# Patient Record
Sex: FEMALE | Race: WHITE | NOT HISPANIC OR LATINO | Employment: UNEMPLOYED | URBAN - METROPOLITAN AREA
[De-identification: names, ages, dates, MRNs, and addresses within clinical notes are randomized per-mention and may not be internally consistent; named-entity substitution may affect disease eponyms.]

---

## 2023-04-18 LAB
ACTIVATED PARTIAL THROMBOPLASTIN TIME IN PPP BY COAGULATION ASSAY: 31 SEC (ref 26–39)
ALANINE AMINOTRANSFERASE (SGPT) (U/L) IN SER/PLAS: 18 U/L (ref 7–45)
ALBUMIN (G/DL) IN SER/PLAS: 4.5 G/DL (ref 3.4–5)
ALKALINE PHOSPHATASE (U/L) IN SER/PLAS: 40 U/L (ref 33–110)
ANION GAP IN SER/PLAS: 11 MMOL/L (ref 10–20)
ASPARTATE AMINOTRANSFERASE (SGOT) (U/L) IN SER/PLAS: 18 U/L (ref 9–39)
BASOPHILS (10*3/UL) IN BLOOD BY AUTOMATED COUNT: 0.02 X10E9/L (ref 0–0.1)
BASOPHILS/100 LEUKOCYTES IN BLOOD BY AUTOMATED COUNT: 0.4 % (ref 0–2)
BILIRUBIN TOTAL (MG/DL) IN SER/PLAS: 0.8 MG/DL (ref 0–1.2)
CALCIUM (MG/DL) IN SER/PLAS: 9.7 MG/DL (ref 8.6–10.3)
CARBON DIOXIDE, TOTAL (MMOL/L) IN SER/PLAS: 30 MMOL/L (ref 21–32)
CHLORIDE (MMOL/L) IN SER/PLAS: 102 MMOL/L (ref 98–107)
CREATININE (MG/DL) IN SER/PLAS: 0.83 MG/DL (ref 0.5–1.05)
EOSINOPHILS (10*3/UL) IN BLOOD BY AUTOMATED COUNT: 0.1 X10E9/L (ref 0–0.7)
EOSINOPHILS/100 LEUKOCYTES IN BLOOD BY AUTOMATED COUNT: 1.8 % (ref 0–6)
ERYTHROCYTE DISTRIBUTION WIDTH (RATIO) BY AUTOMATED COUNT: 13.3 % (ref 11.5–14.5)
ERYTHROCYTE MEAN CORPUSCULAR HEMOGLOBIN CONCENTRATION (G/DL) BY AUTOMATED: 31.4 G/DL (ref 32–36)
ERYTHROCYTE MEAN CORPUSCULAR VOLUME (FL) BY AUTOMATED COUNT: 94 FL (ref 80–100)
ERYTHROCYTES (10*6/UL) IN BLOOD BY AUTOMATED COUNT: 4.53 X10E12/L (ref 4–5.2)
GFR FEMALE: 83 ML/MIN/1.73M2
GLUCOSE (MG/DL) IN SER/PLAS: 93 MG/DL (ref 74–99)
HEMATOCRIT (%) IN BLOOD BY AUTOMATED COUNT: 42.4 % (ref 36–46)
HEMOGLOBIN (G/DL) IN BLOOD: 13.3 G/DL (ref 12–16)
IMMATURE GRANULOCYTES/100 LEUKOCYTES IN BLOOD BY AUTOMATED COUNT: 0 % (ref 0–0.9)
INR IN PPP BY COAGULATION ASSAY: 1 (ref 0.9–1.1)
LEUKOCYTES (10*3/UL) IN BLOOD BY AUTOMATED COUNT: 5.6 X10E9/L (ref 4.4–11.3)
LYMPHOCYTES (10*3/UL) IN BLOOD BY AUTOMATED COUNT: 2.26 X10E9/L (ref 1.2–4.8)
LYMPHOCYTES/100 LEUKOCYTES IN BLOOD BY AUTOMATED COUNT: 40.6 % (ref 13–44)
MONOCYTES (10*3/UL) IN BLOOD BY AUTOMATED COUNT: 0.45 X10E9/L (ref 0.1–1)
MONOCYTES/100 LEUKOCYTES IN BLOOD BY AUTOMATED COUNT: 8.1 % (ref 2–10)
NEUTROPHILS (10*3/UL) IN BLOOD BY AUTOMATED COUNT: 2.74 X10E9/L (ref 1.2–7.7)
NEUTROPHILS/100 LEUKOCYTES IN BLOOD BY AUTOMATED COUNT: 49.1 % (ref 40–80)
PLATELETS (10*3/UL) IN BLOOD AUTOMATED COUNT: 243 X10E9/L (ref 150–450)
POTASSIUM (MMOL/L) IN SER/PLAS: 4.1 MMOL/L (ref 3.5–5.3)
PROTEIN TOTAL: 7.1 G/DL (ref 6.4–8.2)
PROTHROMBIN TIME (PT) IN PPP BY COAGULATION ASSAY: 11.6 SEC (ref 9.8–13.4)
SODIUM (MMOL/L) IN SER/PLAS: 139 MMOL/L (ref 136–145)
UREA NITROGEN (MG/DL) IN SER/PLAS: 23 MG/DL (ref 6–23)

## 2023-04-19 DIAGNOSIS — M19.90 UNSPECIFIED OSTEOARTHRITIS, UNSPECIFIED SITE: ICD-10-CM

## 2023-04-20 DIAGNOSIS — M48.061 FORAMINAL STENOSIS OF LUMBAR REGION: Primary | ICD-10-CM

## 2023-04-20 PROBLEM — M54.50 LOW BACK PAIN: Status: ACTIVE | Noted: 2023-04-20

## 2023-04-20 PROBLEM — E66.9 OBESITY: Status: ACTIVE | Noted: 2023-04-20

## 2023-04-20 PROBLEM — E78.5 HYPERLIPIDEMIA: Status: ACTIVE | Noted: 2023-04-20

## 2023-04-20 PROBLEM — I10 HTN (HYPERTENSION): Status: ACTIVE | Noted: 2023-04-20

## 2023-04-20 PROBLEM — E55.9 VITAMIN D DEFICIENCY, UNSPECIFIED: Status: ACTIVE | Noted: 2023-04-20

## 2023-04-20 PROBLEM — R60.0 LEG EDEMA: Status: ACTIVE | Noted: 2023-04-20

## 2023-04-20 PROBLEM — R92.8 ABNORMAL MAMMOGRAM: Status: ACTIVE | Noted: 2023-04-20

## 2023-04-20 PROBLEM — R69 TAKING DRUG FOR CHRONIC DISEASE: Status: ACTIVE | Noted: 2023-04-20

## 2023-04-20 PROBLEM — M25.561 ARTHRALGIA OF RIGHT KNEE: Status: ACTIVE | Noted: 2023-04-20

## 2023-04-20 PROBLEM — M54.30 SCIATIC NERVE PAIN: Status: ACTIVE | Noted: 2023-04-20

## 2023-04-20 PROBLEM — M25.519 SHOULDER PAIN WITH HISTORY OF REPAIR OF ROTATOR CUFF: Status: ACTIVE | Noted: 2023-04-20

## 2023-04-20 PROBLEM — M19.90 ARTHRITIS: Status: ACTIVE | Noted: 2023-04-20

## 2023-04-20 PROBLEM — M75.100 TEAR OF ROTATOR CUFF: Status: ACTIVE | Noted: 2023-04-20

## 2023-04-20 PROBLEM — G93.2 PSEUDOTUMOR CEREBRI: Status: ACTIVE | Noted: 2023-04-20

## 2023-04-20 PROBLEM — R16.0 HEPATOMEGALY: Status: ACTIVE | Noted: 2023-04-20

## 2023-04-20 PROBLEM — E88.819 INSULIN RESISTANCE: Status: ACTIVE | Noted: 2023-04-20

## 2023-04-20 PROBLEM — Z98.890 SHOULDER PAIN WITH HISTORY OF REPAIR OF ROTATOR CUFF: Status: ACTIVE | Noted: 2023-04-20

## 2023-04-20 PROBLEM — M47.816 DEGENERATIVE JOINT DISEASE (DJD) OF LUMBAR SPINE: Status: ACTIVE | Noted: 2023-04-20

## 2023-04-20 PROBLEM — M25.562 ARTHRALGIA OF LEFT KNEE: Status: ACTIVE | Noted: 2023-04-20

## 2023-04-20 RX ORDER — FUROSEMIDE 20 MG/1
20 TABLET ORAL DAILY
COMMUNITY

## 2023-04-20 RX ORDER — TOPIRAMATE 50 MG/1
TABLET, FILM COATED ORAL 2 TIMES DAILY
COMMUNITY
Start: 2022-04-11 | End: 2024-01-16 | Stop reason: ALTCHOICE

## 2023-04-20 RX ORDER — MELOXICAM 15 MG/1
15 TABLET ORAL DAILY
Qty: 90 TABLET | Refills: 3 | Status: SHIPPED | OUTPATIENT
Start: 2023-04-20 | End: 2023-04-25 | Stop reason: SDUPTHER

## 2023-04-20 RX ORDER — MELOXICAM 15 MG/1
15 TABLET ORAL DAILY
Qty: 90 TABLET | Refills: 3 | Status: CANCELLED | OUTPATIENT
Start: 2023-04-20

## 2023-04-20 RX ORDER — LISINOPRIL AND HYDROCHLOROTHIAZIDE 10; 12.5 MG/1; MG/1
1 TABLET ORAL DAILY
COMMUNITY

## 2023-04-20 RX ORDER — POTASSIUM CHLORIDE 750 MG/1
CAPSULE, EXTENDED RELEASE ORAL
COMMUNITY

## 2023-04-20 RX ORDER — MELOXICAM 15 MG/1
15 TABLET ORAL DAILY
COMMUNITY
End: 2023-04-20 | Stop reason: SDUPTHER

## 2023-04-20 RX ORDER — ERGOCALCIFEROL 1.25 MG/1
1 CAPSULE ORAL
COMMUNITY
Start: 2018-06-12 | End: 2024-01-16 | Stop reason: ALTCHOICE

## 2023-04-20 RX ORDER — MELOXICAM 15 MG/1
TABLET ORAL
Qty: 90 TABLET | Refills: 3 | Status: SHIPPED | OUTPATIENT
Start: 2023-04-20 | End: 2024-05-01

## 2023-04-20 RX ORDER — CYCLOBENZAPRINE HCL 10 MG
1 TABLET ORAL
COMMUNITY
Start: 2023-03-28 | End: 2024-01-16 | Stop reason: ALTCHOICE

## 2023-04-24 ENCOUNTER — HOSPITAL ENCOUNTER (OUTPATIENT)
Dept: DATA CONVERSION | Facility: HOSPITAL | Age: 58
End: 2023-04-24
Attending: ORTHOPAEDIC SURGERY | Admitting: ORTHOPAEDIC SURGERY

## 2023-04-24 DIAGNOSIS — M22.42 CHONDROMALACIA PATELLAE, LEFT KNEE: ICD-10-CM

## 2023-04-24 DIAGNOSIS — S83.242A OTHER TEAR OF MEDIAL MENISCUS, CURRENT INJURY, LEFT KNEE, INITIAL ENCOUNTER: ICD-10-CM

## 2023-04-24 DIAGNOSIS — I10 ESSENTIAL (PRIMARY) HYPERTENSION: ICD-10-CM

## 2023-04-24 DIAGNOSIS — Z79.82 LONG TERM (CURRENT) USE OF ASPIRIN: ICD-10-CM

## 2023-04-25 RX ORDER — MELOXICAM 15 MG/1
15 TABLET ORAL DAILY
Qty: 90 TABLET | Refills: 3 | Status: SHIPPED | OUTPATIENT
Start: 2023-04-25 | End: 2023-11-12 | Stop reason: WASHOUT

## 2023-08-08 LAB
ANION GAP IN SER/PLAS: 10 MMOL/L (ref 10–20)
CALCIUM (MG/DL) IN SER/PLAS: 9.8 MG/DL (ref 8.6–10.3)
CARBON DIOXIDE, TOTAL (MMOL/L) IN SER/PLAS: 33 MMOL/L (ref 21–32)
CHLORIDE (MMOL/L) IN SER/PLAS: 105 MMOL/L (ref 98–107)
CREATININE (MG/DL) IN SER/PLAS: 0.86 MG/DL (ref 0.5–1.05)
ERYTHROCYTE DISTRIBUTION WIDTH (RATIO) BY AUTOMATED COUNT: 13.3 % (ref 11.5–14.5)
ERYTHROCYTE MEAN CORPUSCULAR HEMOGLOBIN CONCENTRATION (G/DL) BY AUTOMATED: 32.1 G/DL (ref 32–36)
ERYTHROCYTE MEAN CORPUSCULAR VOLUME (FL) BY AUTOMATED COUNT: 91 FL (ref 80–100)
ERYTHROCYTES (10*6/UL) IN BLOOD BY AUTOMATED COUNT: 4.6 X10E12/L (ref 4–5.2)
GFR FEMALE: 79 ML/MIN/1.73M2
GLUCOSE (MG/DL) IN SER/PLAS: 78 MG/DL (ref 74–99)
HEMATOCRIT (%) IN BLOOD BY AUTOMATED COUNT: 41.8 % (ref 36–46)
HEMOGLOBIN (G/DL) IN BLOOD: 13.4 G/DL (ref 12–16)
LEUKOCYTES (10*3/UL) IN BLOOD BY AUTOMATED COUNT: 8.1 X10E9/L (ref 4.4–11.3)
NRBC (PER 100 WBCS) BY AUTOMATED COUNT: 0 /100 WBC (ref 0–0)
PLATELETS (10*3/UL) IN BLOOD AUTOMATED COUNT: 249 X10E9/L (ref 150–450)
POTASSIUM (MMOL/L) IN SER/PLAS: 4.6 MMOL/L (ref 3.5–5.3)
SODIUM (MMOL/L) IN SER/PLAS: 143 MMOL/L (ref 136–145)
UREA NITROGEN (MG/DL) IN SER/PLAS: 29 MG/DL (ref 6–23)

## 2023-08-30 ENCOUNTER — HOSPITAL ENCOUNTER (OUTPATIENT)
Dept: DATA CONVERSION | Facility: HOSPITAL | Age: 58
End: 2023-08-30
Attending: SURGERY | Admitting: SURGERY
Payer: COMMERCIAL

## 2023-08-30 DIAGNOSIS — D05.12 INTRADUCTAL CARCINOMA IN SITU OF LEFT BREAST: ICD-10-CM

## 2023-09-06 LAB
COMPLETE PATHOLOGY REPORT: NORMAL
CONVERTED CLINICAL DIAGNOSIS-HISTORY: NORMAL
CONVERTED FINAL DIAGNOSIS: NORMAL
CONVERTED FINAL REPORT PDF LINK TO COPY AND PASTE: NORMAL
CONVERTED GROSS DESCRIPTION: NORMAL
CONVERTED SYNOPTIC DIAGNOSIS: NORMAL

## 2023-09-07 VITALS — HEIGHT: 62 IN | BODY MASS INDEX: 40.73 KG/M2 | WEIGHT: 221.34 LBS

## 2023-09-14 NOTE — H&P
History & Physical Reviewed:   Pregnant/Lactating:  ·  Are You Pregnant no   ·  Are You Currently Breastfeeding no     I have reviewed the History and Physical dated:  17-Apr-2023   History and Physical reviewed and relevant findings noted. Patient examined to review pertinent physical  findings.: No significant changes   Home Medications Reviewed: no changes noted   Allergies Reviewed: no changes noted     Consent:   COVID-19 Consent:  ·  COVID-19 Risk Consent Surgeon has reviewed key risks related to the risk of enedelia COVID-19 and if they contract COVID-19 what the risks are.       Electronic Signatures:  Arden Lomas)  (Signed 24-Apr-2023 07:40)   Authored: History & Physical Reviewed, Consent, Note  Completion      Last Updated: 24-Apr-2023 07:40 by Arden Lomas)

## 2023-09-27 ENCOUNTER — TELEPHONE (OUTPATIENT)
Dept: CARDIOLOGY | Facility: HOSPITAL | Age: 58
End: 2023-09-27
Payer: COMMERCIAL

## 2023-09-28 DIAGNOSIS — D05.12 DUCTAL CARCINOMA IN SITU (DCIS) OF LEFT BREAST: Primary | ICD-10-CM

## 2023-09-29 VITALS
DIASTOLIC BLOOD PRESSURE: 74 MMHG | TEMPERATURE: 98.1 F | HEART RATE: 62 BPM | HEIGHT: 62 IN | RESPIRATION RATE: 18 BRPM | BODY MASS INDEX: 39.96 KG/M2 | WEIGHT: 217.15 LBS | SYSTOLIC BLOOD PRESSURE: 141 MMHG

## 2023-09-30 NOTE — H&P
"    History of Present Illness:   Pregnant/Lactating:  ·  Are You Pregnant no (1)   ·  Are You Currently Breastfeeding no (1)     History Present Illness:  Reason for surgery: left breast cancer   HPI:    Abnormal mammogram and left breast biopsy positive for DCIS.  Here today for surgical management.    Allergies:        Intolerances:  ·  codeine : Nausea/Vomiting    Home Medication Review:   Home Medications Reviewed: yes     Impression/Procedure:   ·  Impression and Planned Procedure: 1. left breast DCIS  -to OR today for left breast magseed localized partial mastectomy       ERAS (Enhanced Recovery After Surgery):  ·  ERAS Patient: no       Vital Signs:  Temperature C: 36.7 degrees C   Temperature F: 98 degrees F   Heart Rate: 62 beats per minute   Respiratory Rate: 18 breath per minute   Blood Pressure Systolic: 141 mm/Hg   Blood Pressure Diastolic: 74 mm/Hg     Physical Exam by System:    Constitutional: Well developed, awake/alert/oriented  x3, no distress, alert and cooperative   Respiratory/Thorax: Patent airways, CTAB, normal  breath sounds with good chest expansion, thorax symmetric   Cardiovascular: Regular, rate and rhythm, no murmurs,  2+ equal pulses of the extremities, normal S 1and S 2   Extremities: normal extremities, no cyanosis edema,  contusions or wounds, no clubbing   Breast: No masses, tenderness, no discharge or discoloration   Skin: Warm and dry, no lesions, no rashes     Consent:   COVID-19 Consent:  ·  COVID-19 Risk Consent Surgeon has reviewed key risks related to the risk of enedelia COVID-19 and if they contract COVID-19 what the risks are.       Electronic Signatures:  Kerry Flores ()  (Signed 30-Aug-2023 08:44)   Authored: History of Present Illness, Allergies, Home  Medication Review, Impression/Procedure, ERAS, Physical Exam, Consent, Note Completion      Last Updated: 30-Aug-2023 08:44 by Kerry Flores ()    References:  1.  Data Referenced From \"Patient Profile - " "Preop v3\" 30-Aug-2023 07:52   "

## 2023-10-01 NOTE — OP NOTE
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 LEFT MAG SEED LOCALIZED PARTIAL MASTECTOMY    Anesthesia: Amy Rojas  Estimated Blood Loss: 5ml  Findings: 1. seed, clip present on specimen xray        Operative Report:     The patient underwent pre-operative magnetic seed localization in the radiology department prior to surgery.  Localization studies were reviewed confirming appropriate localization. The patient was identified by name and birth date and the operative site  was marked. The patient was then transported to the operative suite. A sign-in was performed verifying patient identity, procedure and laterality.  One dose of preoperative antibiotics was given.  After induction of anesthesia the left breast and axilla  were prepped and draped in the usual sterile fashion.   Just prior to incision, a time-out was performed confirming patient identity, procedure and laterality. The sentimag probe was used to identify the location of the targeted lesion and marked on the  skin.  A curvilinear incision was made, and flaps were raised in the direction of the targeted lesion.  The target lesion was then circumferentially dissected using electrocautery.  Circumferential dissection was carried out to include the targeted lesion  and localization seed.  The probe was used to again confirm the presence of the localization seed within the specimen. Once the specimen was completed dissected it was oriented with a silk suture- short suture superior, long suture lateral and passed  off the field.  A specimen radiograph was performed which confirmed the presence of the lesion, biopsy clip, and localization seed.  Six additional cavity margins  were taken: superior, inferior, medial, lateral, anterior and posterior with a clip denoting  the new margin.  The posterior extent of dissection  did include pectoralis fascia. Clips were placed to denote the area of resection. Next, meticulous hemostasis was achieved.  The dermis was closed with 3-0 Vicryl suture and the skin was closed with  a running 4-0 Monocryl.  Benzoin and steri-strips were used as dressing.  The patient was then awoken from anesthesia and transported to the PACU in satisfactory condition.    SPECIMEN:    1. Left magseed localized partial mastectomy short suture superior, long lateral  2. new superior margin  3. new inferior margin  4. new medial margin  5. new lateral margin  6. new anterior margin  7. new posterior margin                        Attestation:   Note Completion:  Attending Attestation I performed the procedure without a resident         Electronic Signatures:  eKrry Flores)  (Signed 30-Aug-2023 10:38)   Authored: Post-Operative Note, Chart Review, Note Completion      Last Updated: 30-Aug-2023 10:38 by Kerry Flores ()

## 2023-10-02 ENCOUNTER — APPOINTMENT (OUTPATIENT)
Dept: RADIOLOGY | Facility: CLINIC | Age: 58
End: 2023-10-02
Payer: COMMERCIAL

## 2023-10-02 ENCOUNTER — APPOINTMENT (OUTPATIENT)
Dept: CARDIOLOGY | Facility: CLINIC | Age: 58
End: 2023-10-02
Payer: COMMERCIAL

## 2023-10-02 NOTE — OP NOTE
PROCEDURE DETAILS    Preoperative Diagnosis:  Other tear of medial meniscus, current injury, left knee, initial encounter, S83.242A, chondromalacia     Postoperative Diagnosis:  Other tear of medial meniscus, current injury, left knee, initial encounter, S83.242A, chondromalacia     Surgeon: Arden Lomas  Resident/Fellow/Other Assistant: Marko Patton    Procedure:  1. LEFT KNEE ARTHROSCOPY MEDIAL MENISCECTOMY, CHONDROPLASTY PATELLA     Anesthesia: General + local   Estimated Blood Loss: 5 ml   Findings: see op report         Operative Report:   Operative findings: (Outerbridge classification 0-4)   Patella: 4  Trochlea: 2  Medial compartment: 2  Lateral compartment: 1    Operative Indications:  Patient was noted to have internal derangement of the knee refractory to non-surgical modalities.  We discussed associated interventions and the risks of the surgery, including DVT/PE, infection, stiffness, medical complications,  and incomplete relief of pre-operative symptoms.    Implants:  None    Operative Procedure:  The patient was met prior to surgery and the appropriate extremity was marked.  We reviewed recent health history and found no contraindication to proceeding with the planned procedure.  The patient was transported to the operating room and underwent  general anesthesia.  The patient was positioned supine on the operative table with all marc prominences well-padded. A sequential compression device was placed on the contralateral leg.  A non-sterile thigh tourniquet was placed and a padded lateral  thigh post.    The leg was prepped and draped in the usual sterile fashion.  A timeout was completed and antibiotic administration was in accordance with standard protocol.  The leg was exsanguinated using an Esmarch bandage and the tourniquet was inflated.  The superficial  landmarks of the knee were palpated and anteromedial and anterolateral portals were created.    A diagnostic arthroscopy was  performed utilizing a fluid pump with sterile saline.  The articular surface of the patella, trochlea, medial and lateral femoral condyles and tibial plateaus were evaluated.  The Outerbridge classifications are listed above.   The gutters were evaluated and no loose bodies were noted. The medial compartment was entered with valgus stress in a slightly flexed knee against the lateral post.  The assistant helped with this to facilitate visualization.  The meniscus was probed  superiorly and inferiorly using a blunt probe.  The notch was inspected and the ACL and PCL were healthy in appearance and had appropriate tension when probed.  The knee was then flexed into a figure of four position and the lateral compartment was entered.       The articular surface of the patella had chondral wear and a component of delamination which we felt could contribute to mechanical symptoms.  A 3.5 mm aggressive plus shaver was used to debride the articular cartilage until stable margins were obtained.    The medial meniscus was noted to have a degenerative tear of the posterior horn.   Using an aggressive plus shaver and meniscal biters, we debrided the meniscus back until we obtained healthy and stable margins.  The meniscectomy extended about 25 % to  the periphery in the affected area.    The medial compartment was fairly tight and the medial collateral ligament was lengthening using an 18 gauge spinal needle in an outside-in fashion. This was done in a controlled fashion and no instability was noted on physical examination after.    The knee was irrigated and lavaged to remove and loose meniscal or chondral debris.  A second pass was made diagnostically to confirm removal of any fragments and inspect for any additional pathology.  The residual fluid was expressed from the knee.   The portals were closed using a buried 3-0 monocryl suture.  Local anesthetic was injected into the soft tissue around the portals. Sterile bandages  were placed.  The patient was stable to the recovery room.    I was present and participated in the entire procedure. The Physician Assistant participated in all critical elements of the procedure under my direct supervision. The surgical incision was closed by the PA under my indirect supervision. There were no  qualified residents available to assist.    Complications: none    Specimen: none    The physician assistant was present for the entire case.  Given the nature of the procedure and disease process a skilled surgical assistant was  necessary for the case.  The assistant was necessary for retraction and helped directly facilitate completion of the surgery.  A certified scrub tech was at the back table managing instruments and supplies for the surgical procedure.  Note Recipients:   Arden Lomas MD - [phone] [PREFERRED]  Frederick Briones DO - [phone] [PREFERRED]                        Attestation:   Note Completion:  Attending Attestation I performed the procedure without a resident         Electronic Signatures:  Arden Lomas)  (Signed 24-Apr-2023 09:53)   Authored: Post-Operative Note, Chart Review, Note Completion      Last Updated: 24-Apr-2023 09:53 by Arden Lomas)

## 2023-10-05 ENCOUNTER — APPOINTMENT (OUTPATIENT)
Dept: RADIOLOGY | Facility: CLINIC | Age: 58
End: 2023-10-05
Payer: COMMERCIAL

## 2023-10-05 ENCOUNTER — APPOINTMENT (OUTPATIENT)
Dept: CARDIOLOGY | Facility: CLINIC | Age: 58
End: 2023-10-05
Payer: COMMERCIAL

## 2023-10-16 ENCOUNTER — APPOINTMENT (OUTPATIENT)
Dept: RADIOLOGY | Facility: HOSPITAL | Age: 58
End: 2023-10-16
Payer: COMMERCIAL

## 2023-10-19 ENCOUNTER — HOSPITAL ENCOUNTER (OUTPATIENT)
Dept: RADIOLOGY | Facility: HOSPITAL | Age: 58
Discharge: HOME | End: 2023-10-19
Payer: COMMERCIAL

## 2023-10-19 DIAGNOSIS — D05.12 DUCTAL CARCINOMA IN SITU (DCIS) OF LEFT BREAST: ICD-10-CM

## 2023-10-19 PROCEDURE — 77080 DXA BONE DENSITY AXIAL: CPT | Performed by: STUDENT IN AN ORGANIZED HEALTH CARE EDUCATION/TRAINING PROGRAM

## 2023-10-19 PROCEDURE — 77080 DXA BONE DENSITY AXIAL: CPT

## 2023-11-09 ENCOUNTER — APPOINTMENT (OUTPATIENT)
Dept: CARDIOLOGY | Facility: CLINIC | Age: 58
End: 2023-11-09

## 2023-11-09 PROBLEM — Z98.890 S/P RIGHT ROTATOR CUFF REPAIR: Status: ACTIVE | Noted: 2020-12-02

## 2023-11-09 PROBLEM — I25.10 CAD (CORONARY ARTERY DISEASE): Status: ACTIVE | Noted: 2023-11-09

## 2023-11-09 PROBLEM — G47.33 OSA (OBSTRUCTIVE SLEEP APNEA): Status: ACTIVE | Noted: 2023-11-09

## 2023-11-09 PROBLEM — M25.611 DECREASED RIGHT SHOULDER RANGE OF MOTION: Status: ACTIVE | Noted: 2020-12-02

## 2023-11-09 RX ORDER — ASPIRIN 81 MG/1
81 TABLET ORAL 2 TIMES DAILY
COMMUNITY
Start: 2023-04-24 | End: 2024-01-16

## 2023-11-09 RX ORDER — ANASTROZOLE 1 MG/1
1 TABLET ORAL DAILY
COMMUNITY
Start: 2023-09-28 | End: 2023-11-12 | Stop reason: WASHOUT

## 2023-11-09 RX ORDER — TOPIRAMATE 25 MG/1
50 TABLET ORAL 2 TIMES DAILY
COMMUNITY
Start: 2023-10-09

## 2023-11-09 RX ORDER — ONDANSETRON 4 MG/1
4 TABLET, FILM COATED ORAL EVERY 8 HOURS PRN
COMMUNITY
Start: 2023-08-30 | End: 2024-01-16 | Stop reason: ALTCHOICE

## 2023-11-09 RX ORDER — AA/PROT/LYSINE/METHIO/VIT C/B6 50-12.5 MG
100 TABLET ORAL
COMMUNITY

## 2023-11-09 RX ORDER — ROSUVASTATIN CALCIUM 20 MG/1
20 TABLET, COATED ORAL DAILY
COMMUNITY
Start: 2023-10-24 | End: 2024-05-03

## 2023-11-09 RX ORDER — FLUTICASONE PROPIONATE 50 MCG
1 SPRAY, SUSPENSION (ML) NASAL DAILY
COMMUNITY
Start: 2023-07-06 | End: 2024-01-16 | Stop reason: ALTCHOICE

## 2023-11-09 RX ORDER — TRAMADOL HYDROCHLORIDE 50 MG/1
50 TABLET ORAL EVERY 8 HOURS PRN
COMMUNITY
Start: 2023-08-30 | End: 2024-01-16 | Stop reason: ALTCHOICE

## 2023-11-10 ENCOUNTER — OFFICE VISIT (OUTPATIENT)
Dept: HEMATOLOGY/ONCOLOGY | Facility: CLINIC | Age: 58
End: 2023-11-10
Payer: COMMERCIAL

## 2023-11-10 VITALS
RESPIRATION RATE: 16 BRPM | TEMPERATURE: 97.5 F | WEIGHT: 212.74 LBS | HEIGHT: 62 IN | BODY MASS INDEX: 39.15 KG/M2 | OXYGEN SATURATION: 96 % | SYSTOLIC BLOOD PRESSURE: 110 MMHG | HEART RATE: 59 BPM | DIASTOLIC BLOOD PRESSURE: 69 MMHG

## 2023-11-10 DIAGNOSIS — D05.12 DUCTAL CARCINOMA IN SITU (DCIS) OF LEFT BREAST: ICD-10-CM

## 2023-11-10 PROCEDURE — 1036F TOBACCO NON-USER: CPT

## 2023-11-10 PROCEDURE — 3078F DIAST BP <80 MM HG: CPT

## 2023-11-10 PROCEDURE — 3074F SYST BP LT 130 MM HG: CPT

## 2023-11-10 PROCEDURE — 99215 OFFICE O/P EST HI 40 MIN: CPT

## 2023-11-10 RX ORDER — ACETAMINOPHEN 500 MG
1 TABLET ORAL DAILY
COMMUNITY

## 2023-11-10 ASSESSMENT — PAIN SCALES - GENERAL: PAINLEVEL: 0-NO PAIN

## 2023-11-10 NOTE — PROGRESS NOTES
"Patient ID: Keara Veronica is a 58 y.o. female.    Oncologic history:   Patient initially diagnosed after screening mammogram on 5/31/2023 and was found to have new left breast   calcifications. She underwent diagnostic imaging on 6/16/2023, which showed calcifications in the deep outer   quadrant measuring 5 mm. Biopsy on 7/7/2023 showed DCIS, grade 2, ER 95%. She underwent a left partial   mastectomy on 8/30/2023, which showed DCIS only measuring 0.6 cm. Margins were greater than 2 mm. ER greater than 95%.     Subjective    HPI  Ms. Keara Veronica is a 57 y/o F presenting for follow up of left breast cancer.     Keara reports that since her last visit she has not started the anastrozole.  She reports this is because she and her  try to do \"as much as possible with as little medicine as possible\" and that she is hesitant about the side effects.  Of note she did see Dr. CHRISSY Camacho on 9/18/2023 and plan was for her to discuss radiation therapy further with her  before making a decision, she ultimately decided to declining radiation therapy due to potential side effects especially of cardiac and pulmonary toxicity.    She reports that she enjoys Pilates and that she tries to eat a balanced, healthy diet.  She does not smoke and does not drink.  She follows with pain management for back pain.    Keara reports feeling well and denies unusual headaches, breast changes or concerns, shortness of breath, cough, skin changes, GI problems or bone pain that is new or worsening.      PMHx: HTN and hypercholesterolemia.    PSHx: Breast lumpectomy, left partial mastectomy, meniscus repair and 2 rotator cuff surgeries. No complications.     FHx: Sister with breast cancer DCIS at age 43, paternal cousin with breast cancer ag30 and brother with glioblastoma age 50.    Social Hx:  with 2 healthy children and 4 grandchildren. Previous smoker of 1 PPD on and off for 10 years. Not currently working.         Objective    Visit " "Vitals  /69   Pulse 59   Temp 36.4 °C (97.5 °F) (Temporal)   Resp 16   Ht 1.574 m (5' 1.97\")   Wt 96.5 kg (212 lb 11.9 oz)   SpO2 96%   BMI 38.95 kg/m²   Smoking Status Former   BSA 2.05 m²      Physical Exam  Constitutional:       General: She is not in acute distress.     Appearance: Normal appearance.   HENT:      Head: Normocephalic.      Mouth/Throat:      Mouth: Mucous membranes are moist.      Pharynx: No oropharyngeal exudate or posterior oropharyngeal erythema.   Eyes:      General: No scleral icterus.     Pupils: Pupils are equal, round, and reactive to light.   Cardiovascular:      Rate and Rhythm: Normal rate and regular rhythm.   Pulmonary:      Effort: Pulmonary effort is normal. No respiratory distress.      Breath sounds: Normal breath sounds. No wheezing.   Abdominal:      General: Abdomen is flat. Bowel sounds are normal. There is no distension.      Palpations: Abdomen is soft.      Tenderness: There is no abdominal tenderness.   Musculoskeletal:         General: Normal range of motion.      Cervical back: Normal range of motion and neck supple.   Skin:     General: Skin is warm and dry.   Neurological:      General: No focal deficit present.      Mental Status: She is alert and oriented to person, place, and time. Mental status is at baseline.      Motor: No weakness.      Gait: Gait normal.   Psychiatric:         Mood and Affect: Mood normal.         Behavior: Behavior normal.         Judgment: Judgment normal.         Performance Status:  Asymptomatic      Assessment/Plan       Cancer Staging   No matching staging information was found for the patient.    Oncology History    No history exists.       1. Left breast DCIS    - initially diagnosed after screening mammogram on 5/31/2023 and was found to have new left breast   calcifications  - diagnostic imaging on 6/16/2023, which showed calcifications in the deep outer quadrant measuring 5 mm. -Biopsy on 7/7/2023 showed DCIS, grade 2, ER " 95%  - left partial mastectomy on 8/30/2023, which showed DCIS only measuring 0.6 cm. Margins were greater than 2 mm. ER greater than 95%.     - Today we discussed given the hormone-positive DCIS, we do recommend endocrine therapy to prevent recurrence in the ipsilateral/contralateral breast. Reviewed side effects today.   - She does not want to continue with radiation therapy.   - We will plan to start anastrozole in the next 1-2 weeks after she has recovered from radiation and we will   plan for a toxicity check with Gennaro 6 weeks post. She knows us to call us if any new symptoms arise before   then.   - RTC with Gennaro in 6 weeks.      11/10/2023:  -Presents for 6-week follow-up  -She has not yet started the anastrozole due to preferring to limit medication use and risks of aromatase inhibitor therapy such as bone loss  -We had a lengthy discussion regarding her case and I answered her questions and concerns  -We discussed healthy lifestyle habits that are recommended with breast cancer survivors such as healthy diet, no smoking or alcohol intake, routine exercise  -We discussed signs and symptoms to monitor for in regards to recurrence of her breast cancer  -She is aware that if she has further questions regarding the statistics and percentages around her breast cancer recurring that that is more appropriate to this discuss with Dr. Veliz but she states that she was previously educated on that  -We did review her DEXA bone density results were normal  -She would like a referral to the [facility] program, I will fax that over  -She is going to think further about taking the anastrozole and discussed with her family/  -She will call me with any questions or concerns in the meantime, or if she needs a new prescription sent 10  -I will have her return the week after [place] for a follow-up with Dr. Veliz including a breast exam which she will be due for at that time     Diagnoses and all orders for  this visit:  Ductal carcinoma in situ (DCIS) of left breast  -     Clinic Appointment Request LYDIA ROMERO  -     Clinic Appointment Request Follow up; Future         Lydia Romero, SUZI-CNP

## 2023-11-15 ENCOUNTER — ANCILLARY PROCEDURE (OUTPATIENT)
Dept: RADIOLOGY | Facility: CLINIC | Age: 58
End: 2023-11-15
Payer: COMMERCIAL

## 2023-11-15 ENCOUNTER — HOSPITAL ENCOUNTER (OUTPATIENT)
Dept: CARDIOLOGY | Facility: CLINIC | Age: 58
Discharge: HOME | End: 2023-11-15
Payer: COMMERCIAL

## 2023-11-15 DIAGNOSIS — I25.10 CAD (CORONARY ARTERY DISEASE): Primary | ICD-10-CM

## 2023-11-15 DIAGNOSIS — I25.118 CORONARY ARTERY DISEASE WITH OTHER FORM OF ANGINA PECTORIS, UNSPECIFIED VESSEL OR LESION TYPE, UNSPECIFIED WHETHER NATIVE OR TRANSPLANTED HEART (CMS-HCC): Primary | ICD-10-CM

## 2023-11-15 DIAGNOSIS — I25.10 ATHEROSCLEROTIC HEART DISEASE OF NATIVE CORONARY ARTERY WITHOUT ANGINA PECTORIS: Primary | ICD-10-CM

## 2023-11-15 DIAGNOSIS — I25.10 ATHEROSCLEROTIC HEART DISEASE OF NATIVE CORONARY ARTERY WITHOUT ANGINA PECTORIS: ICD-10-CM

## 2023-11-15 PROCEDURE — 78452 HT MUSCLE IMAGE SPECT MULT: CPT | Performed by: INTERNAL MEDICINE

## 2023-11-15 PROCEDURE — 93017 CV STRESS TEST TRACING ONLY: CPT

## 2023-11-15 PROCEDURE — 93016 CV STRESS TEST SUPVJ ONLY: CPT | Performed by: INTERNAL MEDICINE

## 2023-11-15 PROCEDURE — 2500000004 HC RX 250 GENERAL PHARMACY W/ HCPCS (ALT 636 FOR OP/ED): Performed by: INTERNAL MEDICINE

## 2023-11-15 PROCEDURE — 78452 HT MUSCLE IMAGE SPECT MULT: CPT

## 2023-11-15 RX ORDER — REGADENOSON 0.08 MG/ML
0.4 INJECTION, SOLUTION INTRAVENOUS ONCE
Status: COMPLETED | OUTPATIENT
Start: 2023-11-15 | End: 2023-11-15

## 2023-11-15 RX ADMIN — REGADENOSON 0.4 MG: 0.08 INJECTION, SOLUTION INTRAVENOUS at 10:07

## 2023-11-22 ENCOUNTER — APPOINTMENT (OUTPATIENT)
Dept: CARDIOLOGY | Facility: CLINIC | Age: 58
End: 2023-11-22
Payer: COMMERCIAL

## 2023-11-22 ENCOUNTER — APPOINTMENT (OUTPATIENT)
Dept: RADIOLOGY | Facility: CLINIC | Age: 58
End: 2023-11-22
Payer: COMMERCIAL

## 2024-01-05 PROBLEM — I25.10 CAD (CORONARY ARTERY DISEASE): Chronic | Status: ACTIVE | Noted: 2023-11-09

## 2024-01-05 PROBLEM — D05.10 INTRADUCTAL CARCINOMA IN SITU OF BREAST: Status: ACTIVE | Noted: 2023-07-26

## 2024-01-05 PROBLEM — E78.5 HYPERLIPIDEMIA: Chronic | Status: ACTIVE | Noted: 2023-04-20

## 2024-01-05 PROBLEM — K63.5 POLYP OF COLON: Status: ACTIVE | Noted: 2024-01-05

## 2024-01-05 PROBLEM — H69.90 DYSFUNCTION OF EUSTACHIAN TUBE: Status: ACTIVE | Noted: 2023-07-06

## 2024-01-05 PROBLEM — E66.812 CLASS 2 OBESITY WITH BODY MASS INDEX (BMI) OF 38.0 TO 38.9 IN ADULT: Status: ACTIVE | Noted: 2023-04-20

## 2024-01-05 PROBLEM — G89.29 CHRONIC LOW BACK PAIN: Status: ACTIVE | Noted: 2023-03-23

## 2024-01-05 PROBLEM — M54.50 CHRONIC LOW BACK PAIN: Status: ACTIVE | Noted: 2023-03-23

## 2024-01-05 PROBLEM — M75.101 TEAR OF RIGHT ROTATOR CUFF: Status: ACTIVE | Noted: 2023-02-01

## 2024-01-05 NOTE — PROGRESS NOTES
Patient ID: Kaera Veronica is a 58 y.o. female.    Oncologic history:   Patient initially diagnosed after screening mammogram on 5/31/2023 and was found to have new left breast   calcifications. She underwent diagnostic imaging on 6/16/2023, which showed calcifications in the deep outer   quadrant measuring 5 mm. Biopsy on 7/7/2023 showed DCIS, grade 2, ER 95%. She underwent a left partial   mastectomy on 8/30/2023, which showed DCIS only measuring 0.6 cm. Margins were greater than 2 mm. ER greater than 95%.     Subjective    HPI  Ms. Keara Veronica is a 59 y/o F presenting for follow-up of left breast DCIS.     Since last visit, patient had a bone density completed on 10/19/2023, which shows a max SUV score of 6, currently normal.     Patient reports that overall she has been feeling well with good energy and good appetite. She reports chronic back pain, however denies any new bone pain. She reports having intermittent left breast pain but denies any new swelling. Denies any new chest pain, cough or shortness of breath. No new lumps or bumps or rashes. No other complaints today.     Patient's past medical history, surgical history, family history and social history reviewed.     Objective    Vitals:    01/08/24 0926   BP: 116/76   Pulse: 69   Resp: 16   Temp: 36.2 °C (97.2 °F)   SpO2: 100%      Review of Systems:   Review of Systems:    Positive per HPI, otherwise negative.      Physical Exam:   Constitutional: Patient appears in no acute distress.   Sitting comfortably in chair.  Eyes: EOMI, clear sclera  ENMT: mucous membranes moist, no apparent injury  Head/Neck: Neck supple, no JVD  Respiratory/Thorax: Patent airways, CTAB, normal  breath sounds, no increased work of breathing  Cardiovascular: Regular, rate and rhythm, no murmurs  Gastrointestinal: Nondistended, soft, non-tender,  no rebound tenderness or guarding, no masses palpable  Extremities: normal extremities, no cyanosis edema,  no swelling  Neurological:  alert and oriented x3, nonfocal, normal  speech and hearing  Lymphatic: No palpable lymphadenopathy in cervical,  axillary  lymph nodes.  Spleen appears normal size.  Psychological: Appropriate mood and behavior, normal  affect  Skin: Warm and dry, no lesions, no rashes     Performance Status:  Symptomatic; fully ambulatory    Labs/Imaging/Pathology: personally reviewed reports and images in Epic electronic medical record system. Pertinent results as it related to the plan represented in below in assessment and plan.     Assessment/Plan   1. Left breast DCIS     - initially diagnosed after screening mammogram on 5/31/2023 and was found to have new left breast   calcifications  - diagnostic imaging on 6/16/2023, which showed calcifications in the deep outer quadrant measuring 5 mm. -Biopsy on 7/7/2023 showed DCIS, grade 2, ER 95%  - left partial mastectomy on 8/30/2023, which showed DCIS only measuring 0.6 cm. Margins were greater than 2 mm. ER greater than 95%.    - Today we discussed given the hormone-positive DCIS, we do recommend endocrine therapy to prevent recurrence in the ipsilateral/contralateral breast. Reviewed side effects today.   - She does not want to continue with radiation therapy.   - We will plan to start anastrozole in the next 1-2 weeks after she has recovered from radiation and we will   plan for a toxicity check with Gennaro 6 weeks post. She knows us to call us if any new symptoms arise before   then.   - RTC with Gennaro in 6 weeks.       1/10/2023:  -Presents for 6-week follow-up  -She has not yet started the anastrozole due to preferring to limit medication use and risks of aromatase inhibitor therapy such as bone loss  -We had a lengthy discussion regarding her case and I answered her questions and concerns  -We discussed healthy lifestyle habits that are recommended with breast cancer survivors such as healthy diet, no smoking or alcohol intake, routine exercise  -We discussed signs and  symptoms to monitor for in regards to recurrence of her breast cancer  -She is aware that if she has further questions regarding the statistics and percentages around her breast cancer recurring that that is more appropriate to this discuss with Dr. Veliz but she states that she was previously educated on that  -We did review her DEXA bone density results were normal  -She would like a referral to the [facility], I will fax that over  -She is going to think further about taking the anastrozole and discussed with her family/  -She will call me with any questions or concerns in the meantime, or if she needs a new prescription sent 10  -I will have her return the week after [place] for a follow-up with Dr. Veliz including a breast exam which she will be due for at that time     1/8/24:  - No evidence of recurrence on exam today.  - We discussed that she does have some pain in her left breast that I do think is related to postsurgical changes. No palpable masses.  - She is planned for mammogram in June and we will see her every 6 months.  - RTC in December for breast exam and surveillance.   - She will see the Breast Team once a year and us once a year for breast exams twice a year and mammogram once a year.  - She continues to wish to stay off of aromatase inhibitors.   - Next bone density will be due in October 2025.  - RTC in December 2024.     RTC in December 2024. This note has been transcribed using a medical scribe and there is a possibility of unintentional typing misprints.      Diagnoses and all orders for this visit:  Ductal carcinoma in situ (DCIS) of left breast  -     Clinic Appointment Request Follow up  -     Clinic Appointment Request; Future    Mary Ann Veliz MD  Hematology/Oncology  [facility]    Scribe Attestation  By signing my name below, Juliette MASCORRO Scribe attest that this documentation has been prepared under the direction and in the presence of  Mary Ann Veliz MD.

## 2024-01-08 ENCOUNTER — OFFICE VISIT (OUTPATIENT)
Dept: HEMATOLOGY/ONCOLOGY | Facility: CLINIC | Age: 59
End: 2024-01-08
Payer: COMMERCIAL

## 2024-01-08 VITALS
WEIGHT: 215.61 LBS | OXYGEN SATURATION: 100 % | RESPIRATION RATE: 16 BRPM | HEART RATE: 69 BPM | TEMPERATURE: 97.2 F | SYSTOLIC BLOOD PRESSURE: 116 MMHG | BODY MASS INDEX: 39.48 KG/M2 | DIASTOLIC BLOOD PRESSURE: 76 MMHG

## 2024-01-08 DIAGNOSIS — D05.12 DUCTAL CARCINOMA IN SITU (DCIS) OF LEFT BREAST: ICD-10-CM

## 2024-01-08 PROCEDURE — 3074F SYST BP LT 130 MM HG: CPT | Performed by: INTERNAL MEDICINE

## 2024-01-08 PROCEDURE — 1036F TOBACCO NON-USER: CPT | Performed by: INTERNAL MEDICINE

## 2024-01-08 PROCEDURE — 99214 OFFICE O/P EST MOD 30 MIN: CPT | Performed by: INTERNAL MEDICINE

## 2024-01-08 PROCEDURE — 3078F DIAST BP <80 MM HG: CPT | Performed by: INTERNAL MEDICINE

## 2024-01-08 ASSESSMENT — PAIN SCALES - GENERAL: PAINLEVEL: 0-NO PAIN

## 2024-01-15 PROBLEM — D05.10 INTRADUCTAL CARCINOMA IN SITU OF BREAST: Chronic | Status: ACTIVE | Noted: 2023-07-26

## 2024-01-15 PROBLEM — G47.33 OSA (OBSTRUCTIVE SLEEP APNEA): Chronic | Status: ACTIVE | Noted: 2023-11-09

## 2024-01-15 PROBLEM — I10 HTN (HYPERTENSION): Chronic | Status: ACTIVE | Noted: 2023-04-20

## 2024-01-15 NOTE — PROGRESS NOTES
Referred by No ref. provider found    HPI Feeling better. No further CP. In Aug she lost her job.    She did not think she was stressed but her symptoms have definitely improved.  She is tolerating the rosuvastatin without difficulty.    Past Medical History:  Problem List Items Addressed This Visit    None       Past Medical History:   Diagnosis Date    CAD (coronary artery disease) 2023 CAC = 0   CAC = 5     Hyperlipidemia 2023    Baseline     Personal history of other diseases of the circulatory system 2022    History of essential hypertension    Unspecified osteoarthritis, unspecified site 2022    Arthritis             Past Surgical History:  She has a past surgical history that includes  section, classic (2015); Breast lumpectomy (2015); Other surgical history (2022); and BI mammo guided breast left localization (Left, 2023).      Social History:  She reports that she quit smoking about 14 years ago. Her smoking use included cigarettes. She started smoking about 42 years ago. She has a 14.00 pack-year smoking history. She has been exposed to tobacco smoke. She has never used smokeless tobacco. She reports that she does not currently use alcohol. She reports that she does not use drugs.    Family History:  Family History   Problem Relation Name Age of Onset    Arthritis Mother      Other (Cardiac disorder) Father      Diabetes Father      Breast cancer Sister      Other (mental disability) Sister          Allergies:  Patient has no known allergies.    Outpatient Medications:  Current Outpatient Medications   Medication Instructions    aspirin 81 mg, oral, 2 times daily    C/sourcherry/celery/grape seed (TART CHERRY ORAL) 1 tablet, oral, Daily    cholecalciferol (Vitamin D3) 5,000 Units tablet 1 tablet, oral, Daily    coenzyme Q-10 100 mg, oral, Daily RT    cyclobenzaprine (Flexeril) 10 mg tablet 1 tablet, oral, Every 6 hours during day     DOCOSAHEXAENOIC ACID ORAL 1 capsule, oral, Daily RT    ergocalciferol (Vitamin D-2) 1.25 MG (16986 UT) capsule 1 capsule, oral, Weekly    fish oil concentrate (Omega-3) 120-180 mg capsule 1 capsule, oral, Daily    fluticasone (Flonase) 50 mcg/actuation nasal spray 1 spray, Each Nostril, Daily    furosemide (LASIX) 20 mg, oral, Daily    GENERIC EXTERNAL MEDICATION 1,000 mg, oral, Daily, Tumeric     KRILL OIL ORAL 1 capsule, oral, Daily    lisinopriL-hydrochlorothiazide 10-12.5 mg tablet 1 tablet, oral, Daily    meloxicam (Mobic) 15 mg tablet TAKE 1 TABLET BY MOUTH EVERY DAY    ondansetron (ZOFRAN) 4 mg, oral, Every 8 hours PRN    potassium chloride ER (Micro-K) 10 mEq ER capsule oral    rosuvastatin (CRESTOR) 20 mg, oral, Daily    topiramate (Topamax) 50 mg tablet oral, 2 times daily    topiramate (TOPAMAX) 50 mg, oral, 2 times daily    traMADol (ULTRAM) 50 mg, oral, Every 8 hours PRN        Last Recorded Vitals:  There were no vitals filed for this visit.    Physical Exam    Physical  Patient is alert and oriented x3.  HEENT is unremarkable mucous members are moist  Neck no JVP no bruits upstrokes are full no thyromegaly  Lungs are clear bilaterally.  No wheezing crackles or rales  Heart regular rhythm normal S1-S2 there is no S3 no murmurs are heard.  Abdomen is soft vessels are positive nontender nondistended no organomegaly no pulsatile masses  Extremities have no edema.  Distal pulses present palpable.  Neuro is grossly nonfocal  Skin has no rashes     Last Labs:  CBC -  Lab Results   Component Value Date    WBC 8.1 08/08/2023    HGB 13.4 08/08/2023    HCT 41.8 08/08/2023    MCV 91 08/08/2023     08/08/2023       CMP -  Lab Results   Component Value Date    CALCIUM 9.8 08/08/2023    PROT 7.1 04/18/2023    ALBUMIN 4.5 04/18/2023    AST 18 04/18/2023    ALT 18 04/18/2023    ALKPHOS 40 04/18/2023    BILITOT 0.8 04/18/2023       LIPID PANEL -   Lab Results   Component Value Date    CHOL 212 (H) 04/07/2022     HDL 56.3 04/07/2022    CHHDL 3.8 04/07/2022    VLDL 19 04/07/2022    TRIG 97 04/07/2022       RENAL FUNCTION PANEL -   Lab Results   Component Value Date    K 4.6 08/08/2023       Lab Results   Component Value Date    HGBA1C 5.3 04/07/2022     Procedure  AST 11/15/2023 normal EKG, normal perfusion, EF 73%    CT / SCORING [05/27/2022] = 5.2 (LAD)     CAC 5/15/2017 0     Pharmacologic stress test 5/18/2017 normal EF 79%     Echo 5/18/2017 EF 65% normal LV function     Echo [facility] 7/25/2014 EF 62% no significant valvular disease         Assessment/Plan   1. CAD. She has a very minimally elevated calcium score 5 units. This is a change from the 0 that she had in 2017. She has vague symptoms of chest pressure which sometimes get worse with exertion. Clinically I suspect this is not anginal.  She had a regadenoson nuclear stress test 11/15/2023 which was normal.  She has had no recurrent symptoms.  Clinically her symptoms are likely reflux related.  Continue conservative care.  She will continue with rosuvastatin.    2. Hyperlipidemia. Her baseline LDL is 136. Now that we have some plaque in her artery her target LDL is less than 70.  She is tolerating the rosuvastatin.  She has not yet had any blood work drawn.  She will have this drawn in the near future and then call our office to review.  Her primary care doctors will follow the lab work and the prescriptions from this point forward.     3. Hypertension well-controlled     4. Breast cancer. She had a lumpectomy. They are contemplating radiation therapy for this. She is not getting chemotherapy. Her LV function in 2014 and in 2017 on her echocardiograms was normal.  I will see Keara in the future as needed.  She will call if there is any issues.    Christina Hernández MD     Instructions and follow up

## 2024-01-16 ENCOUNTER — OFFICE VISIT (OUTPATIENT)
Dept: CARDIOLOGY | Facility: CLINIC | Age: 59
End: 2024-01-16
Payer: COMMERCIAL

## 2024-01-16 VITALS
OXYGEN SATURATION: 99 % | HEIGHT: 61 IN | SYSTOLIC BLOOD PRESSURE: 120 MMHG | WEIGHT: 219 LBS | HEART RATE: 63 BPM | DIASTOLIC BLOOD PRESSURE: 80 MMHG | BODY MASS INDEX: 41.35 KG/M2

## 2024-01-16 DIAGNOSIS — I25.10 CORONARY ARTERY DISEASE INVOLVING NATIVE CORONARY ARTERY OF NATIVE HEART WITHOUT ANGINA PECTORIS: Chronic | ICD-10-CM

## 2024-01-16 DIAGNOSIS — G47.33 OSA (OBSTRUCTIVE SLEEP APNEA): Chronic | ICD-10-CM

## 2024-01-16 DIAGNOSIS — E78.2 MIXED HYPERLIPIDEMIA: Primary | Chronic | ICD-10-CM

## 2024-01-16 DIAGNOSIS — I10 PRIMARY HYPERTENSION: Chronic | ICD-10-CM

## 2024-01-16 PROCEDURE — 99213 OFFICE O/P EST LOW 20 MIN: CPT | Performed by: INTERNAL MEDICINE

## 2024-01-16 PROCEDURE — 3074F SYST BP LT 130 MM HG: CPT | Performed by: INTERNAL MEDICINE

## 2024-01-16 PROCEDURE — 1036F TOBACCO NON-USER: CPT | Performed by: INTERNAL MEDICINE

## 2024-01-16 PROCEDURE — 3079F DIAST BP 80-89 MM HG: CPT | Performed by: INTERNAL MEDICINE

## 2024-01-16 NOTE — PATIENT INSTRUCTIONS
1. CAD. She has a very minimally elevated calcium score 5 units. This is a change from the 0 that she had in 2017. She has vague symptoms of chest pressure which sometimes get worse with exertion. Clinically I suspect this is not anginal.  She had a regadenoson nuclear stress test 11/15/2023 which was normal.  She has had no recurrent symptoms.  Clinically her symptoms are likely reflux related.  Continue conservative care.  She will continue with rosuvastatin.    2. Hyperlipidemia. Her baseline LDL is 136. Now that we have some plaque in her artery her target LDL is less than 70.  She is tolerating the rosuvastatin.  She has not yet had any blood work drawn.  She will have this drawn in the near future and then call our office to review.  Her primary care doctors will follow the lab work and the prescriptions from this point forward.     3. Hypertension well-controlled     4. Breast cancer. She had a lumpectomy. They are contemplating radiation therapy for this. She is not getting chemotherapy. Her LV function in 2014 and in 2017 on her echocardiograms was normal.  I will see Keara in the future as needed.  She will call if there is any issues.

## 2024-05-01 DIAGNOSIS — M19.90 UNSPECIFIED OSTEOARTHRITIS, UNSPECIFIED SITE: ICD-10-CM

## 2024-05-01 DIAGNOSIS — E78.2 MIXED HYPERLIPIDEMIA: ICD-10-CM

## 2024-05-01 RX ORDER — MELOXICAM 15 MG/1
TABLET ORAL
Qty: 30 TABLET | Refills: 8 | Status: SHIPPED | OUTPATIENT
Start: 2024-05-01

## 2024-05-03 RX ORDER — ROSUVASTATIN CALCIUM 20 MG/1
20 TABLET, COATED ORAL DAILY
Qty: 30 TABLET | Refills: 11 | Status: SHIPPED | OUTPATIENT
Start: 2024-05-03

## 2024-06-07 NOTE — PROGRESS NOTES
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  There is no evidence of local recurrence.    Continue follow up with imaging.  We discussed surveillance with yearly mammograms.  If there is any concern for recurrence, we discussed work up with biopsy and recommendation for repeat surgical excision.  If she were to have a recurrence, would ask her to re-evaluate adjuvant therapies.    Will follow up in the breast center with my nurse practitioner partner in 1 year at the time of mammogram or sooner if there are any breast concerns.  I will see Keara  on an as needed basis for any concerns.    Subjective   Keara Veronica is a 58 y.o. female presents today for follow up carcinoma of the left breast.     Treatment history  Surgery: 8/30/2023 left MS PM: DCIS only measuring 0.6cm margins >2mm.   Radiation: declined  Systemic therapy: anastrozole recommended; however, pt declined due to side effects.    Bilateral mammogram today: BIRADS 2, benign.      Review of Systems   Constitutional symptoms: Denies generalized fatigue.  Denies weight change, fevers/chills, difficulty sleeping   Eyes: Denies double vision, glaucoma, cataracts.  Ear/nose/throat/mouth: Denies hearing changes, sore throat, sinus problems.  Cardiovascular: No chest pain.  Denies irregular heartbeat.  Denies ankle swelling.  Respiratory: No wheezing, cough, or shortness of breath.  Gastrointestinal: No abdominal pain,  No nausea/vomiting.  No indigestion/heartburn.  No change in bowel habits.  No constipation or diarrhea.   Genitourinary: No urinary incontinence.  No urinary frequency.  No painful urination.  Musculoskeletal: No bone pain, no muscle pain, no joint pain.   Integumentary: No rash. No masses.  No changes in moles.  No easy bruising.  Neurological: No headaches.  No tremors. No  numbness/tingling.  Psychiatric: No anxiety. No depression.  Endocrine: No excessive thirst.  Not too hot or too cold.  Not tired or fatigued.    Hematological/lymphatic: No swollen glands or blood clotting problems.  No bruising.    Objective   Physical Exam  General: Alert and oriented x 3.  Mood and affect are appropriate.  HEENT: EOMI, PERRLA.   Neck: supple, no masses, no cervical adenopathy.  Cardiovascular: no lower extremity edema.  Pulmonary: breathing non labored on room air.  GI: Abdomen soft, no masses. No hepatomegaly or splenomegaly.  Lymph nodes: No supraclavicular or axillary adenopathy bilaterally.  Musculoskeletal: Full range of motion in the upper extremities bilaterally.  Neuro: denies dizziness, tremors    Breasts: The breasts were examined in both the seated and supine positions.    RIGHT: The nipple is everted without nipple discharge.  There are no skin changes, skin thickening, or dimpling. There are no masses palpated in the RIGHT breast. UOQ transverse scar well healed.  LEFT: The nipple is everted without nipple discharge.  There are no skin changes, skin thickening, or dimpling. There are no masses palpated in the LEFT breast.  UOQ scar well healed.      Radiology review: All images and reports were personally reviewed.         Kerry Flores DO

## 2024-06-10 ENCOUNTER — HOSPITAL ENCOUNTER (OUTPATIENT)
Dept: RADIOLOGY | Facility: HOSPITAL | Age: 59
Discharge: HOME | End: 2024-06-10
Payer: COMMERCIAL

## 2024-06-10 ENCOUNTER — OFFICE VISIT (OUTPATIENT)
Dept: SURGICAL ONCOLOGY | Facility: HOSPITAL | Age: 59
End: 2024-06-10
Payer: COMMERCIAL

## 2024-06-10 VITALS
WEIGHT: 222 LBS | SYSTOLIC BLOOD PRESSURE: 138 MMHG | DIASTOLIC BLOOD PRESSURE: 74 MMHG | HEIGHT: 62 IN | HEART RATE: 63 BPM | BODY MASS INDEX: 40.85 KG/M2

## 2024-06-10 DIAGNOSIS — D05.12 INTRADUCTAL CARCINOMA IN SITU OF LEFT BREAST: ICD-10-CM

## 2024-06-10 DIAGNOSIS — D05.12 DUCTAL CARCINOMA IN SITU (DCIS) OF LEFT BREAST: Chronic | ICD-10-CM

## 2024-06-10 PROCEDURE — 77062 BREAST TOMOSYNTHESIS BI: CPT | Mod: BILATERAL PROCEDURE | Performed by: STUDENT IN AN ORGANIZED HEALTH CARE EDUCATION/TRAINING PROGRAM

## 2024-06-10 PROCEDURE — 3075F SYST BP GE 130 - 139MM HG: CPT | Performed by: SURGERY

## 2024-06-10 PROCEDURE — 77062 BREAST TOMOSYNTHESIS BI: CPT

## 2024-06-10 PROCEDURE — 3078F DIAST BP <80 MM HG: CPT | Performed by: SURGERY

## 2024-06-10 PROCEDURE — 99213 OFFICE O/P EST LOW 20 MIN: CPT | Performed by: SURGERY

## 2024-06-10 PROCEDURE — 1036F TOBACCO NON-USER: CPT | Performed by: SURGERY

## 2024-06-10 PROCEDURE — 77066 DX MAMMO INCL CAD BI: CPT | Mod: BILATERAL PROCEDURE | Performed by: STUDENT IN AN ORGANIZED HEALTH CARE EDUCATION/TRAINING PROGRAM

## 2024-06-10 RX ORDER — ZINC GLUCONATE 50 MG
25 TABLET ORAL DAILY
COMMUNITY

## 2024-12-15 NOTE — PROGRESS NOTES
Patient ID: Keara Veronica is a 59 y.o. female.    Subjective    HPI  Ms. Keara Veronica is a 60 y/o F presenting for follow-up of left breast DCIS. Patient reports some fatigue. Otherwise, doing well. Concerned about weight gain, has gained approx 20 lbs since earlier this year. Reports more sedentary and poor diet. Intermittent sharp pain in left breast. No other complaints    Patient's past medical history, surgical history, family history and social history reviewed.    Review of Systems:   Review of Systems:    Positive per HPI, otherwise negative.     Objective    /70   Pulse 62   Temp 36.7 °C (98.1 °F)   Resp 18   Wt 107 kg (234 lb 12.6 oz)   SpO2 98%   BMI 43.64 kg/m²      Physical Exam  Gen: appears well in clinic, NAD  HEENT: atraumatic head, normocephalic, EOMI, conjunctiva normal  LUNG: no increased WOB, CTAB  CV: No JVD. RRR  GI: soft, NT, ND  LE: no LE edema  Skin: no obvious rashes or lesions on visible skin  Neuro: interactive, no focal deficits noted  Psych: normal mood and affect  Performance Status:  Symptomatic; fully ambulatory    Labs/Imaging/Pathology: personally reviewed reports and images in Epic electronic medical record system. Pertinent results as it related to the plan represented in below in assessment and plan.   Assessment/Plan   1. Left breast DCIS     - initially diagnosed after screening mammogram on 5/31/2023 and was found to have new left breast   calcifications  - diagnostic imaging on 6/16/2023, which showed calcifications in the deep outer quadrant measuring 5 mm. -Biopsy on 7/7/2023 showed DCIS, grade 2, ER 95%  - left partial mastectomy on 8/30/2023, which showed DCIS only measuring 0.6 cm. Margins were greater than 2 mm. ER greater than 95%.    - Today we discussed given the hormone-positive DCIS, we do recommend endocrine therapy to prevent recurrence in the ipsilateral/contralateral breast. Reviewed side effects today.   - She does not want to continue with  radiation therapy.   - discussed endocrine therapy but she declined       12/16/24:   - No evidence of recurrence  - She remains off endocrine therapy, discussed tamoxifen would be there preference but she wishes to stay off   - At this point, will continue with breast exam in 1 year though she understands she can see breast surgery team alone as well  - discussed weight loss options  - information provided for gathering place  - She will vinny her mammogram in 6 months   - RTC in 1 year or prn moving forward      RTC in 1 year. This note has been transcribed using a medical scribe and there is a possibility of unintentional typing misprints.         Diagnoses and all orders for this visit:  Ductal carcinoma in situ (DCIS) of left breast  -     Clinic Appointment Request  -     Clinic Appointment Request OSWALDO VELIZ; Future    Oswaldo Veliz MD  Hematology/Oncology  [facility]      Scribe Attestation  By signing my name below, Bronwyn MASCORRO Gavin Sanchez Scribpedro attest that this documentation has been prepared under the direction and in the presence of Oswaldo Veliz MD.  Time Spent  Prep time on day of patient encounter: 5 minutes  Time spent directly with patient, family or caregiver: 25 minutes  Additional Time Spent on Patient Care Activities: 5 minutes  Documentation Time: 5 minutes  Other Time Spent: 0 minutes  Total: 40 minutes

## 2024-12-16 ENCOUNTER — OFFICE VISIT (OUTPATIENT)
Dept: HEMATOLOGY/ONCOLOGY | Facility: CLINIC | Age: 59
End: 2024-12-16
Payer: COMMERCIAL

## 2024-12-16 VITALS
OXYGEN SATURATION: 98 % | RESPIRATION RATE: 18 BRPM | DIASTOLIC BLOOD PRESSURE: 70 MMHG | WEIGHT: 234.79 LBS | BODY MASS INDEX: 43.64 KG/M2 | SYSTOLIC BLOOD PRESSURE: 114 MMHG | HEART RATE: 62 BPM | TEMPERATURE: 98.1 F

## 2024-12-16 DIAGNOSIS — D05.12 DUCTAL CARCINOMA IN SITU (DCIS) OF LEFT BREAST: ICD-10-CM

## 2024-12-16 PROCEDURE — 3078F DIAST BP <80 MM HG: CPT | Performed by: INTERNAL MEDICINE

## 2024-12-16 PROCEDURE — 99215 OFFICE O/P EST HI 40 MIN: CPT | Performed by: INTERNAL MEDICINE

## 2024-12-16 PROCEDURE — G2211 COMPLEX E/M VISIT ADD ON: HCPCS | Performed by: INTERNAL MEDICINE

## 2024-12-16 PROCEDURE — 3074F SYST BP LT 130 MM HG: CPT | Performed by: INTERNAL MEDICINE

## 2024-12-16 ASSESSMENT — PAIN SCALES - GENERAL: PAINLEVEL_OUTOF10: 0-NO PAIN

## 2025-01-31 ENCOUNTER — OFFICE VISIT (OUTPATIENT)
Dept: ORTHOPEDIC SURGERY | Facility: CLINIC | Age: 60
End: 2025-01-31
Payer: COMMERCIAL

## 2025-01-31 ENCOUNTER — HOSPITAL ENCOUNTER (OUTPATIENT)
Dept: RADIOLOGY | Facility: CLINIC | Age: 60
Discharge: HOME | End: 2025-01-31
Payer: COMMERCIAL

## 2025-01-31 DIAGNOSIS — M25.562 PAIN IN BOTH KNEES, UNSPECIFIED CHRONICITY: ICD-10-CM

## 2025-01-31 DIAGNOSIS — M25.561 PAIN IN BOTH KNEES, UNSPECIFIED CHRONICITY: ICD-10-CM

## 2025-01-31 DIAGNOSIS — M17.0 BILATERAL PRIMARY OSTEOARTHRITIS OF KNEE: ICD-10-CM

## 2025-01-31 PROCEDURE — 73564 X-RAY EXAM KNEE 4 OR MORE: CPT | Mod: 50

## 2025-01-31 PROCEDURE — 99214 OFFICE O/P EST MOD 30 MIN: CPT | Performed by: INTERNAL MEDICINE

## 2025-01-31 RX ORDER — METHYLPREDNISOLONE 4 MG/1
TABLET ORAL
Qty: 1 EACH | Refills: 0 | Status: SHIPPED | OUTPATIENT
Start: 2025-01-31

## 2025-01-31 NOTE — PROGRESS NOTES
Acute Injury New Patient Visit    CC: No chief complaint on file.      HPI: Keara is a 59 y.o. female presents today for evaluation for acute bilateral knee pain which started about two weeks ago, right more than left. No trauma or fall. She notes difficulty flexing her knees or using stairs. She is here for initial evaluation and x-rays.         Review of Systems   GENERAL: Negative for malaise, significant weight loss, fever  MUSCULOSKELETAL: See HPI  NEURO:  Negative for numbness / tingling     Past Medical History  Past Medical History:   Diagnosis Date    Breast cancer (Multi)     CAD (coronary artery disease) 11/09/2023 2017 CAC = 0  2022 CAC = 5     HTN (hypertension) 04/20/2023    Hyperlipidemia 04/20/2023    Baseline     Intraductal carcinoma in situ of breast 07/26/2023    intermediate nuclear grade, solid and cribriform, pattern with necrosis and calcification estrogen receptor positive    ATIYA (obstructive sleep apnea) 11/09/2023    Last Assessment & Plan: Formatting of this note might be different from the original. Assessment: does not use CPAP    Personal history of other diseases of the circulatory system 03/01/2022    History of essential hypertension    Unspecified osteoarthritis, unspecified site 03/01/2022    Arthritis       Medication review  Medication Documentation Review Audit       Reviewed by GABRIELA Luciano (Medical Assistant) on 12/16/24 at 1144      Medication Order Taking? Sig Documenting Provider Last Dose Status   C/sourcherry/celery/grape seed (TART CHERRY ORAL) [id number] Yes Take 1 tablet by mouth once daily. Historical Provider, MD Taking Active   cholecalciferol (Vitamin D3) 5,000 Units tablet [id number] Yes Take 1 tablet (5,000 Units) by mouth once daily. Historical Provider, MD Taking Active   coenzyme Q-10 10 mg capsule [id number] Yes Take 10 capsules (100 mg) by mouth once daily. Historical Provider, MD Taking Active   furosemide (Lasix) 20 mg tablet [id number] Yes  Take 1 tablet (20 mg) by mouth once daily. Historical Provider, MD Taking Active   GENERIC EXTERNAL MEDICATION [id number] Yes Take 1,000 mg by mouth once daily. Tumeric Historical Provider, MD Taking Active   KRILL OIL ORAL [id number] Yes Take 1 capsule by mouth once daily. Historical Provider, MD Taking Active   lisinopriL-hydrochlorothiazide 10-12.5 mg tablet [id number] Yes Take 1 tablet by mouth once daily. Historical Provider, MD Taking Active   meloxicam (Mobic) 15 mg tablet [id number] Yes TAKE 1 TABLET BY MOUTH EVERY DAY Frederick Briones DO Taking Active   potassium chloride ER (Micro-K) 10 mEq ER capsule [id number] Yes Take by mouth. Historical Provider, MD Taking Active   rosuvastatin (Crestor) 20 mg tablet [id number] Yes TAKE 1 TABLET DAILY Christina Hernández MD Taking Active   Tc-99m tetrofosmin (Myoview) injection 12.7 millicurie [id number]   Christina Hernández MD  Active   Tc-99m tetrofosmin (Myoview) injection 35.7 millicurie [id number]   Christina Hernández MD  Active   topiramate (Topamax) 25 mg tablet [id number] Yes Take 2 tablets (50 mg) by mouth 2 times a day. Historical Provider, MD Taking Active   zinc gluconate 50 mg tablet [id number] Yes Take 0.5 tablets (25 mg of elemental zinc) by mouth once daily. Historical Provider, MD  Active                    Allergies  No Known Allergies    Social History  Social History     Socioeconomic History    Marital status:      Spouse name: Not on file    Number of children: Not on file    Years of education: Not on file    Highest education level: Not on file   Occupational History    Not on file   Tobacco Use    Smoking status: Former     Current packs/day: 0.00     Average packs/day: 0.5 packs/day for 28.0 years (14.0 ttl pk-yrs)     Types: Cigarettes     Start date: 1/1/1982     Quit date: 1/1/2010     Years since quitting: 15.0     Passive exposure: Past    Smokeless tobacco: Never   Substance and Sexual Activity    Alcohol use: Not Currently     Comment: did socially drink     Drug use: Never    Sexual activity: Not on file   Other Topics Concern    Not on file   Social History Narrative    Not on file     Social Drivers of Health     Financial Resource Strain: Not on file   Food Insecurity: Not on file   Transportation Needs: Not on file   Physical Activity: Not on file   Stress: Not on file   Social Connections: Not on file   Intimate Partner Violence: Not on file   Housing Stability: Not on file       Surgical History  Past Surgical History:   Procedure Laterality Date    BI MAMMO GUIDED BREAST LEFT LOCALIZATION Left 2023    BI MAMMO GUIDED LOCALIZATION BREAST LEFT 2023 STJ FLIP    BREAST LUMPECTOMY  2015    Right Breast Lumpectomy     SECTION, CLASSIC  2015     Section    OTHER SURGICAL HISTORY  2022    Shoulder surgery       Physical Exam:  GENERAL:  Patient is awake, alert, and oriented to person place and time.  Patient appears well nourished and well kept.  Affect Calm, Not Acutely Distressed.  HEENT:  Normocephalic, Atraumatic, EOMI  CARDIOVASCULAR:  Hemodynamically stable.  RESPIRATORY:  Normal respirations with unlabored breathing.  Extremity: Left knee examination shows skin is intact.  There is no erythema or warmth.  Trace amount of effusion.  Can flex the right knee to 130 degrees.  Full extension at 0 degrees.  Pain over the medial joint line.  Pain over the lateral joint line.  Pain of the medial and lateral patellar facets.  There is no pain over the patellar or quadricep tendon.  No pain over the proximal tibia.  No pain over the popliteal fossa.  Negative valgus stress test.  Negative varus stress test.  Negative Gustavo's test medially with no instability.  Negative Gustavo's test laterally with no instability.  Negative Lachman's test.  Patellar and quadricep mechanism intact.  Negative anterior and posterior drawer test.  Negative patellar apprehension test.  Distal pulses are palpable, neurovascularly intact.  Walking with  no significant antalgic gait.    Right knee examination shows skin is intact.  There is no erythema or warmth.  Trace to 1+ effusion.  Can flex the right knee to 130 degrees.  Full extension at 0 degrees.  Pain over the medial joint line.  Pain over the lateral joint line.  Pain of the medial and lateral patellar facets.  1-2+ patellar crepitus.  Pain with patellar grind test.  Pain over the Pes anserine bursa.  There is no pain over the patellar or quadricep tendon.  No pain over the proximal tibia.  No pain over the popliteal fossa.  Negative valgus stress test.  Negative varus stress test.  Negative Gustavo's test medially with no instability.  Negative Gustavo's test laterally with no instability.  Negative Lachman's test.  Patellar and quadricep mechanism intact.  Negative anterior and posterior drawer test.  Negative patellar apprehension test.  Distal pulses are palpable, neurovascularly intact.  Walking with no significant antalgic gait.          Diagnostics: X-rays reviewed      Procedure: None    Assessment:   Bilateral knee osteoarthritis  Right knee pes anserinus bursitis    Plan: Keara presents today for initial evaluation for acute bilateral knee pain which started about a week ago. . We recommended a Medrol dose Pedro Pablo for the acute inflammation of her knee arthritis and pes anserine bursitis. We also discussed the possibility of future viscosupplementation injections.  She will follow-up in 4 to 5 weeks and reevaluate her knees, if she still symptomatic we may consider an ultrasound-guided intra-articular corticosteroid injection to the affected knee.  We discussed the possibility of future viscosupplement injections and physical therapy.    No orders of the defined types were placed in this encounter.     At the conclusion of the visit there were no further questions by the patient/family regarding their plan of care.  Patient was instructed to call or return with any issues, questions, or concerns  regarding their injury and/or treatment plan described above.     01/31/25 at 11:07 AM - Sydney Marcano MD  Scribe Attestation  By signing my name below, I, Vijay Stahl, Scribe   attest that this documentation has been prepared under the direction and in the presence of Sydney Marcano MD.    Office: [phone]    This note was prepared using voice recognition software.  The details of this note are correct and have been reviewed, and corrected to the best of my ability.  Some grammatical errors may persist related to the Dragon software.

## 2025-02-07 ENCOUNTER — DOCUMENTATION (OUTPATIENT)
Dept: ORTHOPEDIC SURGERY | Facility: CLINIC | Age: 60
End: 2025-02-07
Payer: COMMERCIAL

## 2025-02-07 DIAGNOSIS — M17.0 BILATERAL PRIMARY OSTEOARTHRITIS OF KNEE: ICD-10-CM

## 2025-02-07 RX ORDER — MELOXICAM 15 MG/1
15 TABLET ORAL DAILY
Qty: 30 TABLET | Refills: 0 | Status: CANCELLED | OUTPATIENT
Start: 2025-02-07 | End: 2026-02-07

## 2025-02-11 ENCOUNTER — HOSPITAL ENCOUNTER (OUTPATIENT)
Dept: RADIOLOGY | Facility: EXTERNAL LOCATION | Age: 60
Discharge: HOME | End: 2025-02-11

## 2025-02-11 ENCOUNTER — APPOINTMENT (OUTPATIENT)
Dept: ORTHOPEDIC SURGERY | Facility: CLINIC | Age: 60
End: 2025-02-11
Payer: COMMERCIAL

## 2025-02-11 DIAGNOSIS — M17.0 BILATERAL PRIMARY OSTEOARTHRITIS OF KNEE: ICD-10-CM

## 2025-02-11 DIAGNOSIS — M23.91 INTERNAL DERANGEMENT OF RIGHT KNEE: Primary | ICD-10-CM

## 2025-02-11 PROCEDURE — 1036F TOBACCO NON-USER: CPT | Performed by: INTERNAL MEDICINE

## 2025-02-11 PROCEDURE — 99213 OFFICE O/P EST LOW 20 MIN: CPT | Performed by: INTERNAL MEDICINE

## 2025-02-11 PROCEDURE — 20611 DRAIN/INJ JOINT/BURSA W/US: CPT | Performed by: INTERNAL MEDICINE

## 2025-02-11 RX ORDER — BETAMETHASONE SODIUM PHOSPHATE AND BETAMETHASONE ACETATE 3; 3 MG/ML; MG/ML
2 INJECTION, SUSPENSION INTRA-ARTICULAR; INTRALESIONAL; INTRAMUSCULAR; SOFT TISSUE
Status: COMPLETED | OUTPATIENT
Start: 2025-02-11 | End: 2025-02-11

## 2025-02-11 RX ORDER — LIDOCAINE HYDROCHLORIDE 10 MG/ML
5 INJECTION, SOLUTION INFILTRATION; PERINEURAL
Status: COMPLETED | OUTPATIENT
Start: 2025-02-11 | End: 2025-02-11

## 2025-02-11 RX ADMIN — BETAMETHASONE SODIUM PHOSPHATE AND BETAMETHASONE ACETATE 2 ML: 3; 3 INJECTION, SUSPENSION INTRA-ARTICULAR; INTRALESIONAL; INTRAMUSCULAR; SOFT TISSUE at 14:00

## 2025-02-11 RX ADMIN — LIDOCAINE HYDROCHLORIDE 5 ML: 10 INJECTION, SOLUTION INFILTRATION; PERINEURAL at 14:00

## 2025-02-11 NOTE — PROGRESS NOTES
CC:   No chief complaint on file.      HPI: Keara is a 59 y.o. female presents today to discuss ultrasound-guided intra-articular corticosteroid injections to the right knee for known osteoarthritis. She states that she had significant relief from the oral steroids but the relief was short-lived.  Also noticed increased instability of the right knee.  Left knee has improved.        Review of Systems   GENERAL: Negative for malaise, significant weight loss, fever  MUSCULOSKELETAL: See HPI  NEURO:  Negative for numbness / tingling     Past Medical History  Past Medical History:   Diagnosis Date    Breast cancer (Multi)     CAD (coronary artery disease) 11/09/2023 2017 CAC = 0  2022 CAC = 5     HTN (hypertension) 04/20/2023    Hyperlipidemia 04/20/2023    Baseline     Intraductal carcinoma in situ of breast 07/26/2023    intermediate nuclear grade, solid and cribriform, pattern with necrosis and calcification estrogen receptor positive    ATIYA (obstructive sleep apnea) 11/09/2023    Last Assessment & Plan: Formatting of this note might be different from the original. Assessment: does not use CPAP    Personal history of other diseases of the circulatory system 03/01/2022    History of essential hypertension    Unspecified osteoarthritis, unspecified site 03/01/2022    Arthritis       Medication review  Medication Documentation Review Audit       Reviewed by Rajani Garcia MA (Medical Assistant) on 02/07/25 at 1314      Medication Order Taking? Sig Documenting Provider Last Dose Status   C/sourcherry/celery/grape seed (TART BROOKS ORAL) [id number] No Take 1 tablet by mouth once daily. Historical Provider, MD Taking Active   cholecalciferol (Vitamin D3) 5,000 Units tablet [id number] No Take 1 tablet (5,000 Units) by mouth once daily. Historical Provider, MD Taking Active   coenzyme Q-10 10 mg capsule [id number] No Take 10 capsules (100 mg) by mouth once daily. Historical Provider, MD Taking Active   furosemide  (Lasix) 20 mg tablet [id number] No Take 1 tablet (20 mg) by mouth once daily. Historical Provider, MD Taking Active   GENERIC EXTERNAL MEDICATION [id number] No Take 1,000 mg by mouth once daily. Tumeric Historical MD Jonah Taking Active   KRILL OIL ORAL [id number] No Take 1 capsule by mouth once daily. Historical MD Jonah Taking Active   lisinopriL-hydrochlorothiazide 10-12.5 mg tablet [id number] No Take 1 tablet by mouth once daily. Historical Provider, MD Taking Active   meloxicam (Mobic) 15 mg tablet [id number] No TAKE 1 TABLET BY MOUTH EVERY DAY Frederick Briones DO Taking Active   methylPREDNISolone (Medrol Dospak) 4 mg tablets [id number]  Follow schedule on package instructions Sydney Marcano MD  Active   potassium chloride ER (Micro-K) 10 mEq ER capsule [id number] No Take by mouth. Historical MD Jonah Taking Active   rosuvastatin (Crestor) 20 mg tablet [id number] No TAKE 1 TABLET DAILY Christina Hernández MD Taking Active   Tc-99m tetrofosmin (Myoview) injection 12.7 millicurie [id number]   Christina Hernández MD  Active   Tc-99m tetrofosmin (Myoview) injection 35.7 millicurie [id number]   Christina Hernández MD  Active   topiramate (Topamax) 25 mg tablet [id number] No Take 2 tablets (50 mg) by mouth 2 times a day. Historical MD Jonah Taking Active   zinc gluconate 50 mg tablet [id number]  Take 0.5 tablets (25 mg of elemental zinc) by mouth once daily. Historical Provider, MD  Active                    Allergies  No Known Allergies    Social History  Social History     Socioeconomic History    Marital status:      Spouse name: Not on file    Number of children: Not on file    Years of education: Not on file    Highest education level: Not on file   Occupational History    Not on file   Tobacco Use    Smoking status: Former     Current packs/day: 0.00     Average packs/day: 0.5 packs/day for 28.0 years (14.0 ttl pk-yrs)     Types: Cigarettes     Start date: 1/1/1982     Quit date: 1/1/2010     Years since quitting: 15.1      Passive exposure: Past    Smokeless tobacco: Never   Substance and Sexual Activity    Alcohol use: Not Currently     Comment: did socially drink    Drug use: Never    Sexual activity: Not on file   Other Topics Concern    Not on file   Social History Narrative    Not on file     Social Drivers of Health     Financial Resource Strain: Not on file   Food Insecurity: Not on file   Transportation Needs: Not on file   Physical Activity: Not on file   Stress: Not on file   Social Connections: Not on file   Intimate Partner Violence: Not on file   Housing Stability: Not on file       Surgical History  Past Surgical History:   Procedure Laterality Date    BI MAMMO GUIDED BREAST LEFT LOCALIZATION Left 2023    BI MAMMO GUIDED LOCALIZATION BREAST LEFT 2023 STJ FLIP    BREAST LUMPECTOMY  2015    Right Breast Lumpectomy     SECTION, CLASSIC  2015     Section    OTHER SURGICAL HISTORY  2022    Shoulder surgery       Physical Exam:  GENERAL:  Patient is awake, alert, and oriented to person place and time.  Patient appears well nourished and well kept.  Affect Calm, Not Acutely Distressed.  HEENT:  Normocephalic, Atraumatic, EOMI  CARDIOVASCULAR:  Hemodynamically stable.  RESPIRATORY:  Normal respirations with unlabored breathing.  Extremity: Bilateral knee skin is intact. There is no erythema or warmth. There is no clinical signs of infection.  Trace to 1+ effusion.  She can flex right knee to 120 degrees with pain.  Full extension at 0 degrees.  Pain of the medial joint line.  Pain of the medial and lateral patellar facets.  Patellar and quadricep mechanism intact.  Negative valgus and varus stress test.  Positive Gustavo's test medially with slight instability.  Negative Lachman's test.  Walking with slight antalgic gait.      Diagnostics: None today   XR knee 4+ views bilateral  Interpreted By:  Sydney Marcano,   STUDY:  XR KNEE 4+ VIEWS BILATERAL, 2025 11:25 am       INDICATION:  Signs/Symptoms:Pain      ACCESSION NUMBER(S):  [id number]      ORDERING CLINICIAN:  SYDNEY EPPERSON      FINDINGS:  Bilateral knee x-rays four views AP, lateral, tunnel and sunrise  view: No acute fractures, no dislocation. Tricompartmental  degenerative joint disease of both knees, most pronounced at the  medial compartment of both knees and patellofemoral compartment with  joint space narrowing.          Signed by: Sydney Epperson 1/31/2025 11:48 AM  Dictation workstation:   [id number]        Procedure: L Inj/Asp: R knee on 2/11/2025 2:00 PM  Indications: pain  Details: 22 G needle, ultrasound-guided lateral approach  Medications: 2 mL betamethasone acet,sod phos 6 mg/mL; 5 mL lidocaine 10 mg/mL (1 %)  Outcome: tolerated well, no immediate complications  Procedure, treatment alternatives, risks and benefits explained, specific risks discussed. Consent was given by the patient. Immediately prior to procedure a time out was called to verify the correct patient, procedure, equipment, support staff and site/side marked as required. Patient was prepped and draped in the usual sterile fashion.             Assessment:   Bilateral knee osteoarthritis, right more through the left  Right knee internal derangement    Plan: Keara presents today for increased pain of the right knee and to discuss ultrasound-guided intra-articular corticosteroid injections to right knee for known osteoarthritis. We discussed the risks and benefits of the injection. She was agreeable to the procedure. She felt immediate relief after the injection. We discussed that she can repeat the injection to the right knee anytime after 3-4 months.  Recommended MRI of the right knee to evaluate for right knee internal derangement, possible loose body.  We discussed the possibility of future viscosupplementation injections.      No orders of the defined types were placed in this encounter.     At the conclusion of the visit there were no  further questions by the patient/family regarding their plan of care.  Patient was instructed to call or return with any issues, questions, or concerns regarding their injury and/or treatment plan described above.     02/11/25 at 1:09 PM - Sydney Marcano MD  Scribe Attestation  By signing my name below, I, Vijay Stahl, Scribe   attest that this documentation has been prepared under the direction and in the presence of Sydney Marcano MD.    Office: [phone]    This note was prepared using voice recognition software.  The details of this note are correct and have been reviewed, and corrected to the best of my ability.  Some grammatical errors may persist related to the Dragon software.

## 2025-03-13 ENCOUNTER — APPOINTMENT (OUTPATIENT)
Dept: ORTHOPEDIC SURGERY | Facility: CLINIC | Age: 60
End: 2025-03-13
Payer: COMMERCIAL

## 2025-03-21 ENCOUNTER — HOSPITAL ENCOUNTER (OUTPATIENT)
Dept: RADIOLOGY | Facility: CLINIC | Age: 60
Discharge: HOME | End: 2025-03-21
Payer: COMMERCIAL

## 2025-03-21 DIAGNOSIS — M23.91 INTERNAL DERANGEMENT OF RIGHT KNEE: ICD-10-CM

## 2025-03-21 PROCEDURE — 73721 MRI JNT OF LWR EXTRE W/O DYE: CPT | Mod: RT

## 2025-03-27 ENCOUNTER — APPOINTMENT (OUTPATIENT)
Dept: ORTHOPEDIC SURGERY | Facility: CLINIC | Age: 60
End: 2025-03-27
Payer: COMMERCIAL

## 2025-03-27 ENCOUNTER — TELEPHONE (OUTPATIENT)
Dept: GASTROENTEROLOGY | Facility: CLINIC | Age: 60
End: 2025-03-27
Payer: COMMERCIAL

## 2025-03-27 NOTE — TELEPHONE ENCOUNTER
Patient called to schedule colonoscopy. Patient had a colonoscopy done by Dr. Monroe in 2020, and was instructed to have a repeat in 5 years. Following the instructions, patient stated that she would like to schedule colonoscopy at this time. Please review. Thank you.

## 2025-04-01 ENCOUNTER — APPOINTMENT (OUTPATIENT)
Dept: ORTHOPEDIC SURGERY | Facility: CLINIC | Age: 60
End: 2025-04-01
Payer: COMMERCIAL

## 2025-04-01 ENCOUNTER — TELEPHONE (OUTPATIENT)
Dept: ORTHOPEDIC SURGERY | Facility: CLINIC | Age: 60
End: 2025-04-01

## 2025-04-01 DIAGNOSIS — M23.91 INTERNAL DERANGEMENT OF RIGHT KNEE: Primary | ICD-10-CM

## 2025-04-01 PROCEDURE — 1036F TOBACCO NON-USER: CPT | Performed by: INTERNAL MEDICINE

## 2025-04-01 PROCEDURE — 99213 OFFICE O/P EST LOW 20 MIN: CPT | Performed by: INTERNAL MEDICINE

## 2025-04-01 NOTE — PROGRESS NOTES
CC:   Chief Complaint   Patient presents with    Right Knee - Follow-up     MRI results review  S/p CSI - 02/11/25       HPI: Keara is a 59 y.o. female presents today for follow-up for MRI review, she did get relief after the ultrasound-guided corticosteroid junction of the right knee.  Still having pain, with minimal mechanical symptoms.  Also has a history of chronic low back pain which does radiate down her left leg.        Review of Systems   GENERAL: Negative for malaise, significant weight loss, fever  MUSCULOSKELETAL: See HPI  NEURO:  Negative for numbness / tingling     Past Medical History  Past Medical History:   Diagnosis Date    Breast cancer     CAD (coronary artery disease) 11/09/2023 2017 CAC = 0  2022 CAC = 5     HTN (hypertension) 04/20/2023    Hyperlipidemia 04/20/2023    Baseline     Intraductal carcinoma in situ of breast 07/26/2023    intermediate nuclear grade, solid and cribriform, pattern with necrosis and calcification estrogen receptor positive    ATIYA (obstructive sleep apnea) 11/09/2023    Last Assessment & Plan: Formatting of this note might be different from the original. Assessment: does not use CPAP    Personal history of other diseases of the circulatory system 03/01/2022    History of essential hypertension    Unspecified osteoarthritis, unspecified site 03/01/2022    Arthritis       Medication review  Medication Documentation Review Audit       Reviewed by Elizabeth Chawla MA (Medical Assistant) on 04/01/25 at 0901      Medication Order Taking? Sig Documenting Provider Last Dose Status   C/sourcherry/celery/grape seed (TART BROOKS ORAL) [id number] No Take 1 tablet by mouth once daily. Historical Provider, MD Taking Active   cholecalciferol (Vitamin D3) 5,000 Units tablet [id number] No Take 1 tablet (5,000 Units) by mouth once daily. Historical Provider, MD Taking Active   coenzyme Q-10 10 mg capsule [id number] No Take 10 capsules (100 mg) by mouth once daily. Historical  Provider, MD Taking Active   furosemide (Lasix) 20 mg tablet [id number] No Take 1 tablet (20 mg) by mouth once daily. Historical Provider, MD Taking Active   GENERIC EXTERNAL MEDICATION [id number] No Take 1,000 mg by mouth once daily. Tumeric Historical Provider, MD Taking Active   KRILL OIL ORAL [id number] No Take 1 capsule by mouth once daily. Historical Provider, MD Taking Active   lisinopriL-hydrochlorothiazide 10-12.5 mg tablet [id number] No Take 1 tablet by mouth once daily. Historical Provider, MD Taking Active   meloxicam (Mobic) 15 mg tablet [id number] No TAKE 1 TABLET BY MOUTH EVERY DAY Frdeerick Briones DO Taking Active   methylPREDNISolone (Medrol Dospak) 4 mg tablets [id number]  Follow schedule on package instructions Sydney Marcano MD  Active   potassium chloride ER (Micro-K) 10 mEq ER capsule [id number] No Take by mouth. Historical Provider, MD Taking Active   rosuvastatin (Crestor) 20 mg tablet [id number] No TAKE 1 TABLET DAILY Christina Hernández MD Taking Active   Tc-99m tetrofosmin (Myoview) injection 12.7 millicurie [id number]   Christina Hernández MD  Active   Tc-99m tetrofosmin (Myoview) injection 35.7 millicurie [id number]   Christina Hernández MD  Active   topiramate (Topamax) 25 mg tablet [id number] No Take 2 tablets (50 mg) by mouth 2 times a day. Historical Provider, MD Taking Active   zinc gluconate 50 mg tablet [id number]  Take 0.5 tablets (25 mg of elemental zinc) by mouth once daily. Historical Provider, MD  Active                    Allergies  No Known Allergies    Social History  Social History     Socioeconomic History    Marital status:      Spouse name: Not on file    Number of children: Not on file    Years of education: Not on file    Highest education level: Not on file   Occupational History    Not on file   Tobacco Use    Smoking status: Former     Current packs/day: 0.00     Average packs/day: 0.5 packs/day for 28.0 years (14.0 ttl pk-yrs)     Types: Cigarettes     Start date: 1/1/1982     Quit date:  2010     Years since quitting: 15.2     Passive exposure: Past    Smokeless tobacco: Never   Substance and Sexual Activity    Alcohol use: Not Currently     Comment: did socially drink    Drug use: Never    Sexual activity: Not on file   Other Topics Concern    Not on file   Social History Narrative    Not on file     Social Drivers of Health     Financial Resource Strain: Not on file   Food Insecurity: Not on file   Transportation Needs: Not on file   Physical Activity: Not on file   Stress: Not on file   Social Connections: Not on file   Intimate Partner Violence: Not on file   Housing Stability: Not on file       Surgical History  Past Surgical History:   Procedure Laterality Date    BI MAMMO GUIDED BREAST LEFT LOCALIZATION Left 2023    BI MAMMO GUIDED LOCALIZATION BREAST LEFT 2023 STJ FLIP    BREAST LUMPECTOMY  2015    Right Breast Lumpectomy     SECTION, CLASSIC  2015     Section    OTHER SURGICAL HISTORY  2022    Shoulder surgery       Physical Exam:  GENERAL:  Patient is awake, alert, and oriented to person place and time.  Patient appears well nourished and well kept.  Affect Calm, Not Acutely Distressed.  HEENT:  Normocephalic, Atraumatic, EOMI  CARDIOVASCULAR:  Hemodynamically stable.  RESPIRATORY:  Normal respirations with unlabored breathing.  Extremity:  Right knee skin is intact. There is no erythema or warmth. There is no clinical signs of infection.  Trace amount of effusion.  She can flex right knee to 120 degrees with mild pain.  Full extension at 0 degrees.  Pain of the medial joint line.  Pain of the medial and lateral patellar facets.  Patellar and quadricep mechanism intact.  Positive valgus stress test with slight instability.  Negative varus stress test.  Positive Gustavo's test medially with no instability.  Negative Lachman's test.  Negative Lachman's test.  Walking with slight antalgic gait.       Diagnostics: MRI reviewed  MR knee right wo IV  contrast  Narrative: Interpreted By:  Frederick Paredes,   STUDY:  MR KNEE RIGHT WO IV CONTRAST; ;  3/21/2025 9:54 am      INDICATION:  Signs/Symptoms:pain.      COMPARISON:  None.      ACCESSION NUMBER(S):  [id number]      ORDERING CLINICIAN:  NIEVES EPPERSON      TECHNIQUE:  Multiplanar and multisequential MR images of the right knee are  performed.      FINDINGS:  There is a moderate-sized joint effusion. There is ill-defined fluid  and edema in the deep anterior subcutaneous fat at the midline and  extending lateral of midline. There is irregular fluid and edema  within and surrounding the popliteus muscle and myotendinous junction  compatible with interstitial tearing. This extends inferiorly beyond  the field of imaging. The popliteus tendon remains intact.      There are findings of tricompartmental osteoarthritis which are  severe in the medial compartment with severe thinning of the  articular cartilage, joint space narrowing, and subcortical  degenerative bone marrow signal. There is peripheral displacement of  the medial meniscus. Subcortical cyst formation is identified at the  base of the medial tibial spine reaching 1 cm in diameter. There is  no sign of acute osseous fracture or loose osteochondral lesion.      The lateral meniscus is of normal morphology. There is no linear tear  truncation.      The medial meniscus demonstrates complex linear signal in the  posterior horn which extends to the inferior articular surface and  free edge. The body of the meniscus is displaced peripherally. There  is complex signal throughout the body extending to the superior and  inferior articular surfaces as well as the free edge. The anterior  horn is intact.      There is some ill-defined fluid and edema superficial to the medial  collateral ligament above and below the joint line. There is no focal  ligament discontinuity or laxity.      The anterior and posterior cruciate ligaments, lateral  collateral  ligament complex, extensor complex, and patellar retinacula are  intact.      Impression: Findings of tricompartmental osteoarthritis, severe in the medial  compartment.      Complex tearing of the posterior horn and body of the medial meniscus  as detailed above.      Grade 1 sprain of the medial collateral ligament.      Interstitial tearing of the popliteus muscle and myotendinous  junction. The tendon is intact.      Moderate-sized joint effusion and ill-defined fluid and edema in the  anterolateral deep subcutaneous fat.          MACRO:  None      Signed by: Frederick Paredes 3/21/2025 11:04 AM  Dictation workstation:   [id number]        Procedure: None    Assessment:   Right knee medial meniscal tear  Right knee tricompartmental osteoarthritis, severe in the medial compartment     Plan: Keara presents today for follow-up for MRI review. We discussed the MRI review in detail today.  We discussed both surgical and nonsurgical options.  We discussed the risk of arthroscopic meniscectomy, would most likely worsen her underlying knee arthritis and progressed to a knee replacement.  We did recommend a trial of conservative treatment with medial unloading OA brace, and 3 series viscosupplement injection.  She will continue physical therapy and weight loss program.  She will follow-up upon approval of the gel injection.  Will also refer to spine team for chronic low back pain with left-sided lumbar radiculopathy.    The patient was prescribed a medial  brace for medial compartment DJD.  Physical examination positive for mild laxity with varus stress.  The patient is ambulatory with or without aid: But, has weakness, instability and/or deformity of their left knee which requires stabilization from this orthosis to improve their function.    In conclusion, this patient has OA of the knee which is symptomatic causing significant morning stiffness lasting up to an hour with popping, clicking,  grinding with ROM, which is worse with prolonged standing or going up/down stairs.  This affects functional activities and ADLs.  There is radiographic evidence of OA with joint space narrowing and marginal osteophyte formation.  This patient has also failed pharmacologic treatment for OA including OTC analgesics, antiinflammatory medications, as well as intraarticular corticosteroid injections.  For these reasons, I feel the patient is a candidate for viscosupplementation injections of the right knee.     No orders of the defined types were placed in this encounter.     At the conclusion of the visit there were no further questions by the patient/family regarding their plan of care.  Patient was instructed to call or return with any issues, questions, or concerns regarding their injury and/or treatment plan described above.     04/01/25 at 9:05 AM - Sydney Marcano MD  Scribe Attestation  By signing my name below, I, Vijay Stahl, Scribpedro   attest that this documentation has been prepared under the direction and in the presence of Sydney Marcano MD.    Office: [phone]    This note was prepared using voice recognition software.  The details of this note are correct and have been reviewed, and corrected to the best of my ability.  Some grammatical errors may persist related to the Dragon software.

## 2025-04-15 ENCOUNTER — HOSPITAL ENCOUNTER (OUTPATIENT)
Dept: RADIOLOGY | Facility: CLINIC | Age: 60
Discharge: HOME | End: 2025-04-15
Payer: COMMERCIAL

## 2025-04-15 ENCOUNTER — APPOINTMENT (OUTPATIENT)
Dept: ORTHOPEDIC SURGERY | Facility: CLINIC | Age: 60
End: 2025-04-15
Payer: COMMERCIAL

## 2025-04-15 DIAGNOSIS — M54.10 BACK PAIN WITH RADICULOPATHY: Primary | ICD-10-CM

## 2025-04-15 DIAGNOSIS — M54.10 BACK PAIN WITH RADICULOPATHY: ICD-10-CM

## 2025-04-15 PROCEDURE — 99214 OFFICE O/P EST MOD 30 MIN: CPT | Performed by: PHYSICIAN ASSISTANT

## 2025-04-15 PROCEDURE — 72120 X-RAY BEND ONLY L-S SPINE: CPT

## 2025-04-15 PROCEDURE — 72110 X-RAY EXAM L-2 SPINE 4/>VWS: CPT | Performed by: RADIOLOGY

## 2025-04-15 NOTE — PROGRESS NOTES
New establish to the practice comes the office complaining of low back pain.  She has had it for years.  She has had prior treatments as far as physical therapy she has had epidural injections also.  But denies any spine surgery.  She cannot lie flat in bed.  She also has a bad knee she says is bone-on-bone on the right.  She gets radiculopathy and paresthesia all the way down the left leg to the foot occasionally on the right.  No bowel or bladder complaints no saddle anesthesia no gait or balance changes no recent trauma accidents or falls to cause it no spine surgeries.    We looked at patient's recent lab recheck hemoglobin A1c last 1 was 5.3.  We looked at her bone density study that was done just a couple years ago was normal.  -0.5.  BMI was 43.64.  Looked at primary doctors note check medication list see if she is on a statin medication because muscular aches and pain and she is taking Crestor.    Physical exam: Patient morbidly obese per BMI standards no lymphangitis or lymphadenopathy in the examined extremities.  Good perfusion to the extremities ×4.  Radial and dorsalis pedis pulses 2+.  Capillary refill to all 4 digits brisk.  No distal edema x 4.  Patient ambulating with a limp favoring the right leg slightly.  Probably due to her bone-on-bone knee issues.  Full range of motion cervical spine and observation.  Patient has very good range of motion of the lumbar spine able to get down almost touch the floor.  Good strength no instability moving upper extremities by difficulty motor strength intact.  Examination of the lower extremities reveals no point tenderness, swelling, or deformity.  Range of motion of the hips, knees, and ankles are full without crepitance, instability, or exacerbation of pain.  Strength is 5/5 throughout.  No redness, abrasions, or lesions on all 4 extremities, head and neck, or trunk.  Patient neurologically intact    X-ray: X-ray taken today AP lateral flexion and extension  shows a mild scoliosis about 6 degrees.  Looks like an bone-on-bone autofusion L4-5 bilateral pars defects at L4-5 bilateral pars defects at L5-S1 with a spondylolisthesis which is mildly dynamic and degenerative changes there also.  Looked at an MRI back in 2023 showing degenerative disc severe L4-5 L5-S1 severe central and foraminal stenosis at both those levels also.  Bilateral pars defects L4-5 L5-S1.    Assessment: This a patient with low back pain radicular pain we talked about treatment options continuing conservative care.  We talked about continuing epidural injections I discussed with her extensively about proper nutrition getting her BMI down under 40.  She is considering a surgical intervention.  I explained to her surgery would probably be going off her old MRI interbody fusion at L5-S1 instrumentation L4-5 L5-S1.  With posterior lateral fusion    Plan: I will order a new MRI at [facility] for diagnostic purposes of possible surgical discussion.  And she is going to follow-up with Dr. Cooper to see if he recommends any surgery

## 2025-06-10 ASSESSMENT — ENCOUNTER SYMPTOMS
CARDIOVASCULAR NEGATIVE: 1
EYES NEGATIVE: 1
MUSCULOSKELETAL NEGATIVE: 1
GASTROINTESTINAL NEGATIVE: 1
ENDOCRINE NEGATIVE: 1
PSYCHIATRIC NEGATIVE: 1
NEUROLOGICAL NEGATIVE: 1
HEMATOLOGIC/LYMPHATIC NEGATIVE: 1
CONSTITUTIONAL NEGATIVE: 1

## 2025-06-10 NOTE — PROGRESS NOTES
[facility]  Keara Veronica female   1965 59 y.o.   [id number]      Chief Complaint  Follow up annual mammogram and exam,     History Of Present Illness  Keara Veronica is a pleasant 59 y.o.  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 She declined radiation therapy. Anastrozole was recommended and patient declined due to side effects. She has a history of a right breast excisional biopsy, benign. She has family history of breast cancer see below. She presents today for annual mammogram and exam.    BREAST IMAGIN/10/2024 Bilateral diagnostic mammogram, indicates BI-RADS Category 2.     REPRODUCTIVE HISTORY: menarche age 13, , first birth age 22,  a short time with each, no OCP's, natural menopause age 45, no HRT, heterogeneously dense     FAMILY CANCER HISTORY:  Sister: Breast cancer, age 43 (DCIS, no genetics, mastectomy, endocrine therapy)  Paternal Cousin: Breast cancer, 30's  Brother: Glioblastoma, age 50      Review of Systems  Review of Systems   Constitutional: Negative.    HENT:  Negative.     Eyes: Negative.    Cardiovascular: Negative.    Gastrointestinal: Negative.    Endocrine: Negative.    Genitourinary: Negative.     Musculoskeletal: Negative.    Neurological: Negative.    Hematological: Negative.    Psychiatric/Behavioral: Negative.         Surgical History  She has a past surgical history that includes  section, classic (2015); Breast lumpectomy (2015); Other surgical history (2022); and BI mammo guided breast left localization (Left, 2023).    Family History  Cancer-related family history includes Breast cancer in her sister and another family member.     Social History  She reports that she quit smoking about 15 years ago. Her smoking use included cigarettes. She started smoking about 43 years ago. She has a 14 pack-year smoking history. She has been  exposed to tobacco smoke. She has never used smokeless tobacco. She reports that she does not currently use alcohol. She reports that she does not use drugs.    Allergies  Patient has no known allergies.    Medications  Current Outpatient Medications   Medication Instructions   • C/sourcherry/celery/grape seed (TART CHERRY ORAL) 1 tablet, Daily   • cholecalciferol (Vitamin D3) 5,000 Units tablet 1 tablet, Daily   • coenzyme Q-10 100 mg, Daily RT   • furosemide (LASIX) 20 mg, Daily   • GENERIC EXTERNAL MEDICATION 1,000 mg, Daily   • KRILL OIL ORAL 1 capsule, Daily   • lisinopriL-hydrochlorothiazide 10-12.5 mg tablet 1 tablet, Daily   • meloxicam (Mobic) 15 mg tablet TAKE 1 TABLET BY MOUTH EVERY DAY   • methylPREDNISolone (Medrol Dospak) 4 mg tablets Follow schedule on package instructions   • potassium chloride ER (Micro-K) 10 mEq ER capsule Take by mouth.   • rosuvastatin (CRESTOR) 20 mg, oral, Daily   • topiramate (TOPAMAX) 50 mg, 2 times daily   • zinc gluconate 50 mg tablet 25 mg of elemental zinc, Daily       Last Recorded Vitals  Vitals:    06/12/25 0759   BP: 139/83   Pulse: 68   Resp: 16       Physical Exam  Chest:       Patient is alert and oriented x3 and in a relaxed and appropriate mood. Her gait is steady and hand grasps are equal. Sclera is clear. The breasts are nearly symmetrical. Right breast has a well healed excisional biopsy incision and left breast has a well healed partial mastectomy incision. The tissue is soft without palpable abnormalities, discrete nodules or masses. The skin and nipples appear normal. There is no cervical, supraclavicular or axillary lymphadenopathy.       Relevant Results and Imaging  Study Result    Narrative & Impression   Interpreted By:  Lit Sharma,   STUDY:  BI MAMMO BILATERAL DIAGNOSTIC TOMOSYNTHESIS;  6/12/2025 8:45 am      ACCESSION NUMBER(S):  [id number]      ORDERING CLINICIAN:  DELMRE EDWARDS      INDICATION:  Annual mammogram. History of a left lumpectomy  without chemotherapy  or radiation in August 2023. History of a right excisional biopsy in  2000.      ,D05.12 Intraductal carcinoma in situ of left breast      COMPARISON:  06/10/2024 and additional images dating back to 05/27/2022.      FINDINGS:  2D and tomosynthesis images were reviewed at 1 mm slice thickness.      Density:  The breasts are heterogeneously dense, which may obscure  small masses.      Stable appearance of postsurgical scarring and surgical clips in the  superolateral left breast at posterior depth from prior lumpectomy.  Stable appearance of postsurgical scarring in the superolateral right  breast from prior benign excisional biopsy. No suspicious masses or  calcifications are identified.      IMPRESSION:  No mammographic evidence of malignancy.      BI-RADS CATEGORY:  BI-RADS Category:  2 Benign.  Recommendation:  Annual Screening.  Recommended Date:  1 Year.  Laterality:  Bilateral.      For any future breast imaging appointments, please call [phone]  ([phone]).          MACRO:  None      Signed by: Lit Sharma 6/12/2025 8:59 AM  Dictation workstation:   [id number]     I explained the results in depth, along with suggested explanation for follow up recommendations based on the testing results. BI-RADS Category 2      Orders  Orders Placed This Encounter   Procedures   • BI mammo bilateral diagnostic tomosynthesis     Due June 13 2024     Standing Status:   Future     Expected Date:   6/13/2026     Expiration Date:   7/12/2026     Reason for exam::   history left breast cancer     Radiologist to Determine Optimal Study:   Yes     Release result to [facility]ITT[facility]:   Immediate [1]     Is this exam part of a Research Study? If Yes, link this order to the research study:   No       Visit Diagnosis  1. Encounter for follow-up surveillance of breast cancer  Clinic Appointment Request Follow Up    BI mammo bilateral diagnostic tomosynthesis            Assessment  Breast cancer surveillance, normal  clinical exam and imaging, hx right breast cancer, right lumpectomy, family history breast cancer, heterogeneously dense    Plan  Return June 2026 for bilateral diagnostic mammogram and office visit.     Patient Discussion/Summary  Your clinical examination and imaging are normal. Please return in one year for mammogram and office visit or sooner if you have any problems or concerns.     You can see your health information, review clinical summaries from office visits & test results online when you follow your health with MY  Chart, a personal health record. To sign up go to [email or web address]. If you need assistance with signing up or trouble getting into your account call [employer] Patient Line 24/7 at [phone].    My office phone number is [phone] if you need to get in touch with me or have additional questions or concerns. Thank you for choosing [facility] and trusting me as your healthcare provider. I look forward to seeing you again at your next office visit. I am honored to be a provider on your health care team and I remain dedicated to helping you achieve your health goals.    Perlita Valladares, APRN-CNP

## 2025-06-12 ENCOUNTER — HOSPITAL ENCOUNTER (OUTPATIENT)
Dept: RADIOLOGY | Facility: HOSPITAL | Age: 60
Discharge: HOME | End: 2025-06-12
Payer: COMMERCIAL

## 2025-06-12 ENCOUNTER — OFFICE VISIT (OUTPATIENT)
Dept: SURGICAL ONCOLOGY | Facility: HOSPITAL | Age: 60
End: 2025-06-12
Payer: COMMERCIAL

## 2025-06-12 VITALS
DIASTOLIC BLOOD PRESSURE: 83 MMHG | WEIGHT: 235 LBS | SYSTOLIC BLOOD PRESSURE: 139 MMHG | HEART RATE: 68 BPM | HEIGHT: 62 IN | BODY MASS INDEX: 43.24 KG/M2 | RESPIRATION RATE: 16 BRPM

## 2025-06-12 DIAGNOSIS — D05.12 DUCTAL CARCINOMA IN SITU (DCIS) OF LEFT BREAST: Chronic | ICD-10-CM

## 2025-06-12 DIAGNOSIS — Z85.3 ENCOUNTER FOR FOLLOW-UP SURVEILLANCE OF BREAST CANCER: Primary | ICD-10-CM

## 2025-06-12 DIAGNOSIS — Z08 ENCOUNTER FOR FOLLOW-UP SURVEILLANCE OF BREAST CANCER: Primary | ICD-10-CM

## 2025-06-12 PROCEDURE — 3079F DIAST BP 80-89 MM HG: CPT | Performed by: NURSE PRACTITIONER

## 2025-06-12 PROCEDURE — 99213 OFFICE O/P EST LOW 20 MIN: CPT | Performed by: NURSE PRACTITIONER

## 2025-06-12 PROCEDURE — 3008F BODY MASS INDEX DOCD: CPT | Performed by: NURSE PRACTITIONER

## 2025-06-12 PROCEDURE — 77062 BREAST TOMOSYNTHESIS BI: CPT

## 2025-06-12 PROCEDURE — 1036F TOBACCO NON-USER: CPT | Performed by: NURSE PRACTITIONER

## 2025-06-12 PROCEDURE — 3075F SYST BP GE 130 - 139MM HG: CPT | Performed by: NURSE PRACTITIONER

## 2025-08-12 DIAGNOSIS — Z12.11 COLON CANCER SCREENING: Primary | ICD-10-CM

## 2025-08-12 RX ORDER — SODIUM, POTASSIUM,MAG SULFATES 17.5-3.13G
SOLUTION, RECONSTITUTED, ORAL ORAL
Qty: 354 ML | Refills: 0 | Status: SHIPPED | OUTPATIENT
Start: 2025-08-12

## 2025-08-18 ENCOUNTER — HOSPITAL ENCOUNTER (OUTPATIENT)
Dept: GASTROENTEROLOGY | Facility: HOSPITAL | Age: 60
Discharge: HOME | End: 2025-08-18
Payer: COMMERCIAL

## 2025-08-18 ENCOUNTER — ANESTHESIA EVENT (OUTPATIENT)
Dept: GASTROENTEROLOGY | Facility: HOSPITAL | Age: 60
End: 2025-08-18
Payer: COMMERCIAL

## 2025-08-18 ENCOUNTER — ANESTHESIA (OUTPATIENT)
Dept: GASTROENTEROLOGY | Facility: HOSPITAL | Age: 60
End: 2025-08-18
Payer: COMMERCIAL

## 2025-08-18 VITALS
TEMPERATURE: 97.5 F | OXYGEN SATURATION: 98 % | DIASTOLIC BLOOD PRESSURE: 56 MMHG | RESPIRATION RATE: 16 BRPM | SYSTOLIC BLOOD PRESSURE: 113 MMHG | HEART RATE: 58 BPM

## 2025-08-18 DIAGNOSIS — Z12.11 ENCOUNTER FOR SCREENING FOR MALIGNANT NEOPLASM OF COLON: Primary | ICD-10-CM

## 2025-08-18 PROBLEM — Z98.890 PONV (POSTOPERATIVE NAUSEA AND VOMITING): Status: ACTIVE | Noted: 2025-08-18

## 2025-08-18 PROBLEM — R11.2 PONV (POSTOPERATIVE NAUSEA AND VOMITING): Status: ACTIVE | Noted: 2025-08-18

## 2025-08-18 PROCEDURE — 7100000010 HC PHASE TWO TIME - EACH INCREMENTAL 1 MINUTE

## 2025-08-18 PROCEDURE — A45385 PR COLONOSCOPY,REMV LESN,SNARE: Performed by: NURSE ANESTHETIST, CERTIFIED REGISTERED

## 2025-08-18 PROCEDURE — 2500000004 HC RX 250 GENERAL PHARMACY W/ HCPCS (ALT 636 FOR OP/ED): Performed by: NURSE ANESTHETIST, CERTIFIED REGISTERED

## 2025-08-18 PROCEDURE — 7100000009 HC PHASE TWO TIME - INITIAL BASE CHARGE

## 2025-08-18 PROCEDURE — 3700000001 HC GENERAL ANESTHESIA TIME - INITIAL BASE CHARGE

## 2025-08-18 PROCEDURE — 3700000002 HC GENERAL ANESTHESIA TIME - EACH INCREMENTAL 1 MINUTE

## 2025-08-18 PROCEDURE — A45385 PR COLONOSCOPY,REMV LESN,SNARE: Performed by: ANESTHESIOLOGY

## 2025-08-18 PROCEDURE — 45385 COLONOSCOPY W/LESION REMOVAL: CPT | Performed by: INTERNAL MEDICINE

## 2025-08-18 RX ORDER — PROPOFOL 10 MG/ML
INJECTION, EMULSION INTRAVENOUS AS NEEDED
Status: DISCONTINUED | OUTPATIENT
Start: 2025-08-18 | End: 2025-08-18

## 2025-08-18 RX ORDER — LIDOCAINE HCL/PF 100 MG/5ML
SYRINGE (ML) INTRAVENOUS AS NEEDED
Status: DISCONTINUED | OUTPATIENT
Start: 2025-08-18 | End: 2025-08-18

## 2025-08-18 RX ORDER — SODIUM CHLORIDE 0.9 % (FLUSH) 0.9 %
SYRINGE (ML) INJECTION AS NEEDED
Status: DISCONTINUED | OUTPATIENT
Start: 2025-08-18 | End: 2025-08-18

## 2025-08-18 RX ADMIN — PROPOFOL 100 MCG/KG/MIN: 10 INJECTION, EMULSION INTRAVENOUS at 08:49

## 2025-08-18 RX ADMIN — LIDOCAINE HYDROCHLORIDE 60 MG: 20 INJECTION, SOLUTION INTRAVENOUS at 08:48

## 2025-08-18 RX ADMIN — Medication 10 ML: at 09:04

## 2025-08-18 RX ADMIN — PROPOFOL 50 MG: 10 INJECTION, EMULSION INTRAVENOUS at 08:54

## 2025-08-18 RX ADMIN — PROPOFOL 100 MG: 10 INJECTION, EMULSION INTRAVENOUS at 08:48

## 2025-08-18 SDOH — HEALTH STABILITY: MENTAL HEALTH: CURRENT SMOKER: 0

## 2025-08-18 ASSESSMENT — PAIN - FUNCTIONAL ASSESSMENT
PAIN_FUNCTIONAL_ASSESSMENT: 0-10

## 2025-08-18 ASSESSMENT — PAIN SCALES - GENERAL
PAINLEVEL_OUTOF10: 0 - NO PAIN

## 2025-08-27 LAB
LABORATORY COMMENT REPORT: NORMAL
PATH REPORT.FINAL DX SPEC: NORMAL
PATH REPORT.GROSS SPEC: NORMAL
PATH REPORT.RELEVANT HX SPEC: NORMAL
PATH REPORT.TOTAL CANCER: NORMAL